# Patient Record
Sex: MALE | Race: ASIAN | NOT HISPANIC OR LATINO
[De-identification: names, ages, dates, MRNs, and addresses within clinical notes are randomized per-mention and may not be internally consistent; named-entity substitution may affect disease eponyms.]

---

## 2017-03-06 ENCOUNTER — MEDICATION RENEWAL (OUTPATIENT)
Age: 67
End: 2017-03-06

## 2017-03-06 ENCOUNTER — RX RENEWAL (OUTPATIENT)
Age: 67
End: 2017-03-06

## 2017-03-30 ENCOUNTER — TRANSCRIPTION ENCOUNTER (OUTPATIENT)
Age: 67
End: 2017-03-30

## 2017-03-31 ENCOUNTER — TRANSCRIPTION ENCOUNTER (OUTPATIENT)
Age: 67
End: 2017-03-31

## 2017-05-01 ENCOUNTER — APPOINTMENT (OUTPATIENT)
Dept: CARDIOLOGY | Facility: CLINIC | Age: 67
End: 2017-05-01

## 2017-05-01 ENCOUNTER — NON-APPOINTMENT (OUTPATIENT)
Age: 67
End: 2017-05-01

## 2017-05-01 VITALS
DIASTOLIC BLOOD PRESSURE: 81 MMHG | HEIGHT: 62 IN | BODY MASS INDEX: 22.45 KG/M2 | OXYGEN SATURATION: 100 % | SYSTOLIC BLOOD PRESSURE: 134 MMHG | HEART RATE: 50 BPM | WEIGHT: 122 LBS

## 2017-05-01 DIAGNOSIS — R00.1 BRADYCARDIA, UNSPECIFIED: ICD-10-CM

## 2017-05-18 ENCOUNTER — APPOINTMENT (OUTPATIENT)
Dept: CARDIOLOGY | Facility: CLINIC | Age: 67
End: 2017-05-18

## 2017-05-23 ENCOUNTER — MEDICATION RENEWAL (OUTPATIENT)
Age: 67
End: 2017-05-23

## 2017-06-05 ENCOUNTER — RX RENEWAL (OUTPATIENT)
Age: 67
End: 2017-06-05

## 2017-07-20 ENCOUNTER — RX RENEWAL (OUTPATIENT)
Age: 67
End: 2017-07-20

## 2017-10-23 ENCOUNTER — APPOINTMENT (OUTPATIENT)
Dept: CARDIOLOGY | Facility: CLINIC | Age: 67
End: 2017-10-23
Payer: MEDICARE

## 2017-10-23 PROCEDURE — 93015 CV STRESS TEST SUPVJ I&R: CPT

## 2017-10-23 PROCEDURE — 78452 HT MUSCLE IMAGE SPECT MULT: CPT

## 2017-10-23 PROCEDURE — A9500: CPT

## 2017-10-31 ENCOUNTER — APPOINTMENT (OUTPATIENT)
Dept: OTOLARYNGOLOGY | Facility: CLINIC | Age: 67
End: 2017-10-31
Payer: MEDICARE

## 2017-10-31 PROCEDURE — 99213 OFFICE O/P EST LOW 20 MIN: CPT

## 2017-11-02 ENCOUNTER — RX RENEWAL (OUTPATIENT)
Age: 67
End: 2017-11-02

## 2017-11-09 ENCOUNTER — APPOINTMENT (OUTPATIENT)
Dept: CARDIOLOGY | Facility: CLINIC | Age: 67
End: 2017-11-09
Payer: MEDICARE

## 2017-11-09 VITALS
HEIGHT: 62 IN | WEIGHT: 125 LBS | HEART RATE: 51 BPM | SYSTOLIC BLOOD PRESSURE: 124 MMHG | OXYGEN SATURATION: 100 % | DIASTOLIC BLOOD PRESSURE: 79 MMHG | BODY MASS INDEX: 23 KG/M2

## 2017-11-09 PROCEDURE — 99214 OFFICE O/P EST MOD 30 MIN: CPT

## 2018-02-21 ENCOUNTER — MEDICATION RENEWAL (OUTPATIENT)
Age: 68
End: 2018-02-21

## 2018-03-05 ENCOUNTER — RX RENEWAL (OUTPATIENT)
Age: 68
End: 2018-03-05

## 2018-04-05 ENCOUNTER — RX RENEWAL (OUTPATIENT)
Age: 68
End: 2018-04-05

## 2018-04-19 ENCOUNTER — NON-APPOINTMENT (OUTPATIENT)
Age: 68
End: 2018-04-19

## 2018-04-19 ENCOUNTER — APPOINTMENT (OUTPATIENT)
Dept: CARDIOLOGY | Facility: CLINIC | Age: 68
End: 2018-04-19
Payer: MEDICARE

## 2018-04-19 VITALS
DIASTOLIC BLOOD PRESSURE: 80 MMHG | OXYGEN SATURATION: 100 % | SYSTOLIC BLOOD PRESSURE: 145 MMHG | HEART RATE: 50 BPM | BODY MASS INDEX: 22.26 KG/M2 | WEIGHT: 121 LBS | HEIGHT: 62 IN

## 2018-04-19 PROCEDURE — 99214 OFFICE O/P EST MOD 30 MIN: CPT

## 2018-04-19 PROCEDURE — 93000 ELECTROCARDIOGRAM COMPLETE: CPT

## 2018-05-29 ENCOUNTER — RX RENEWAL (OUTPATIENT)
Age: 68
End: 2018-05-29

## 2018-06-25 ENCOUNTER — RX RENEWAL (OUTPATIENT)
Age: 68
End: 2018-06-25

## 2018-06-25 ENCOUNTER — MEDICATION RENEWAL (OUTPATIENT)
Age: 68
End: 2018-06-25

## 2018-08-09 ENCOUNTER — RX RENEWAL (OUTPATIENT)
Age: 68
End: 2018-08-09

## 2018-10-18 ENCOUNTER — APPOINTMENT (OUTPATIENT)
Dept: CARDIOLOGY | Facility: CLINIC | Age: 68
End: 2018-10-18

## 2018-11-21 ENCOUNTER — APPOINTMENT (OUTPATIENT)
Dept: CARDIOLOGY | Facility: CLINIC | Age: 68
End: 2018-11-21
Payer: MEDICARE

## 2018-11-21 VITALS
HEIGHT: 62 IN | DIASTOLIC BLOOD PRESSURE: 76 MMHG | WEIGHT: 122 LBS | OXYGEN SATURATION: 100 % | SYSTOLIC BLOOD PRESSURE: 136 MMHG | BODY MASS INDEX: 22.45 KG/M2 | HEART RATE: 52 BPM

## 2018-11-21 PROCEDURE — 99214 OFFICE O/P EST MOD 30 MIN: CPT

## 2018-11-21 NOTE — REASON FOR VISIT
[Follow-Up - Clinic] : a clinic follow-up of [Angina Pectoris] : angina pectoris [Coronary Artery Disease] : coronary artery disease [CABG Follow-up] : bypass graft [Hyperlipidemia] : hyperlipidemia [FreeTextEntry1] : Recent stent placed to LIMA-LAD anastomosis

## 2018-11-21 NOTE — HISTORY OF PRESENT ILLNESS
[FreeTextEntry1] : I saw Chano Monge in the office today for a follow up visit. He is a 67-year-old male who is status post coronary bypass graft surgery in 1991 with a LIMA to the LAD, and vein graft to diagonal, OM, and RCA. In 2006 he underwent repeat cardiac catheterization. This showed that the internal mammary graft was closed . There was retrograde filling from a patent diagonal bypass graft to the LAD. This was compromised by an area of 50-70% stenosis. [facility] did not feel that this was amenable to angioplasty. Did go for a second opinion and to [facility] and so Dr. Leavitt who felt that he might attempt this. Since the patient's symptoms were stable we elected not to do this.\par \par The patient has had stable exertional angina on medication. On his  visit, 10/12,  the patient  noted that the angina was coming on more easily and more frequently. They were reminiscent of the symptoms he had prior to his bypass surgery 21 years ago. Also has associated shortness of breath\par \par .We did a regular stress test in November of 2011 with the patient exercised to 12.8 METs. He did have some discomfort with ECG evidence positive for ischemia. His last nuclear stress test in October of 2010  demonstrated chest discomfort with the thallium portion showing normal perfusion.  \par \par  He did have a carotid Doppler 7/14 that showed mild nonobstructing plaque bilaterally. A repeat study performed 5/17 showed no mild to moderate nonobstructive plaque.\par \par An ECG during the October 2012 visit showed new T-wave inversions in the anterior leads. Patient was sent to [facility] for cardiac catheterization which was performed by Dr. Streeter. This showed that his LIMA graft was patent but had a 95% stenosis at the site of anastomosis to the LAD. All 3 native vessels were closed. The vein graft to the circumflex artery is closed, with a 40% lesion in the RCA graft. The patient underwent successful balloon angioplasty of the anastomotic site between the LIMA graft and LAD. The patient's angina had completely resolved since that procedure. A followup electrocardiogram performed in your office demonstrated that the T wave inversions in the anterior leads have resolved. The patient discontinued his Ranexa.\par \par 8/13  the patient had recurrent symptoms of angina. He underwent repeat cardiac catheterization. This showed 90% left main stenosis, and 100% stenosis of all native blood vessels. There was a 90% stenosis at the anastomosis between the LIMA graft and the LAD, 50% stenosis of the vein graft going to the PDA, and a normal vein graft to D1. A stent was placed to the anastomosis between LIMA and LAD.\par \par The patient continued to have stable exertional angina. Also very rarely gets angina at night when he wakes up from bed dreaming. He had a repeat nuclear stress test 10/17. The treadmill showed ST segment depressions with positive chest pain 9 and 1/2 minutes into exercise. Then converted to a chemical tests. Scans were normal.\par \par Patient continues to have stable exertional angina. The symptoms have not significantly changed.\par \par Blood work performed 7/18 demonstrates a total cholesterol 137, triglycerides 46, HDL 83, and LDL 45. CBC and chemistries were unremarkable\par

## 2018-11-21 NOTE — PHYSICAL EXAM
[General Appearance - Well Developed] : well developed [Normal Appearance] : normal appearance [Well Groomed] : well groomed [General Appearance - Well Nourished] : well nourished [No Deformities] : no deformities [General Appearance - In No Acute Distress] : no acute distress [Normal Conjunctiva] : the conjunctiva exhibited no abnormalities [Normal Oral Mucosa] : normal oral mucosa [Normal Jugular Venous A Waves Present] : normal jugular venous A waves present [Normal Jugular Venous V Waves Present] : normal jugular venous V waves present [No Jugular Venous Dominguez A Waves] : no jugular venous dominguez A waves [Bowel Sounds] : normal bowel sounds [Abdomen Soft] : soft [Abdomen Tenderness] : non-tender [Abnormal Walk] : normal gait [Gait - Sufficient For Exercise Testing] : the gait was sufficient for exercise testing [Nail Clubbing] : no clubbing of the fingernails [Cyanosis, Localized] : no localized cyanosis [Skin Color & Pigmentation] : normal skin color and pigmentation [Skin Turgor] : normal skin turgor [Oriented To Time, Place, And Person] : oriented to person, place, and time [Impaired Insight] : insight and judgment were intact [No Anxiety] : not feeling anxious [] : no respiratory distress [Respiration, Rhythm And Depth] : normal respiratory rhythm and effort [Exaggerated Use Of Accessory Muscles For Inspiration] : no accessory muscle use [Auscultation Breath Sounds / Voice Sounds] : lungs were clear to auscultation bilaterally [Normal Rate] : normal [Normal S1] : normal S1 [Normal S2] : normal S2 [No Murmur] : no murmurs heard [2+] : left 2+ [No Abnormalities] : the abdominal aorta was not enlarged and no bruit was heard [No Pitting Edema] : no pitting edema present [S3] : no S3 [S4] : no S4 [Right Carotid Bruit] : no bruit heard over the right carotid [Left Carotid Bruit] : no bruit heard over the left carotid [Right Femoral Bruit] : no bruit heard over the right femoral artery [Left Femoral Bruit] : no bruit heard over the left femoral artery

## 2018-11-21 NOTE — DISCUSSION/SUMMARY
[FreeTextEntry1] : The patient is doing well. He has stable exertional angina without change. On his medications blood pressure and cholesterol fairly well controlled.\par \par He'll stay on his present medication. If his angina gets any worse he will call me. He will schedule a nuclear stress test in the spring and I will see him in 6 months.

## 2018-11-21 NOTE — REVIEW OF SYSTEMS
[Blurry Vision] : blurred vision [see HPI] : see HPI [Joint Pain] : joint pain [Easy Bleeding] : a tendency for easy bleeding [Negative] : Psychiatric [Fever] : no fever [Headache] : no headache [Chills] : no chills [Feeling Fatigued] : not feeling fatigued [Seeing Double (Diplopia)] : no diplopia [Earache] : no earache [Sore Throat] : no sore throat [Sinus Pressure] : no sinus pressure [Dyspnea on exertion] : not dyspnea during exertion [Chest  Pressure] : no chest pressure [Chest Pain] : no chest pain [Lower Ext Edema] : no extremity edema [Leg Claudication] : no intermittent leg claudication [Palpitations] : no palpitations [Skin: A Rash] : no rash: [Skin Lesions] : no skin lesions [Dizziness] : no dizziness [Tremor] : no tremor was seen [Convulsions] : no convulsions [Excessive Thirst] : no polydipsia [Easy Bruising] : no tendency for easy bruising

## 2018-11-26 ENCOUNTER — MEDICATION RENEWAL (OUTPATIENT)
Age: 68
End: 2018-11-26

## 2018-12-17 ENCOUNTER — RX RENEWAL (OUTPATIENT)
Age: 68
End: 2018-12-17

## 2019-03-06 ENCOUNTER — TRANSCRIPTION ENCOUNTER (OUTPATIENT)
Age: 69
End: 2019-03-06

## 2019-04-01 ENCOUNTER — RX RENEWAL (OUTPATIENT)
Age: 69
End: 2019-04-01

## 2019-04-02 ENCOUNTER — RX RENEWAL (OUTPATIENT)
Age: 69
End: 2019-04-02

## 2019-04-17 ENCOUNTER — APPOINTMENT (OUTPATIENT)
Dept: CARDIOLOGY | Facility: CLINIC | Age: 69
End: 2019-04-17
Payer: MEDICARE

## 2019-04-17 PROCEDURE — 78452 HT MUSCLE IMAGE SPECT MULT: CPT

## 2019-04-17 PROCEDURE — 93015 CV STRESS TEST SUPVJ I&R: CPT

## 2019-04-17 PROCEDURE — A9500: CPT

## 2019-04-30 ENCOUNTER — APPOINTMENT (OUTPATIENT)
Dept: CARDIOLOGY | Facility: CLINIC | Age: 69
End: 2019-04-30
Payer: MEDICARE

## 2019-04-30 ENCOUNTER — NON-APPOINTMENT (OUTPATIENT)
Age: 69
End: 2019-04-30

## 2019-04-30 VITALS
WEIGHT: 121 LBS | OXYGEN SATURATION: 100 % | BODY MASS INDEX: 22.26 KG/M2 | HEART RATE: 48 BPM | DIASTOLIC BLOOD PRESSURE: 80 MMHG | HEIGHT: 62 IN | SYSTOLIC BLOOD PRESSURE: 129 MMHG

## 2019-04-30 PROCEDURE — 93000 ELECTROCARDIOGRAM COMPLETE: CPT

## 2019-04-30 PROCEDURE — 99215 OFFICE O/P EST HI 40 MIN: CPT

## 2019-04-30 NOTE — HISTORY OF PRESENT ILLNESS
[FreeTextEntry1] : I saw Chano Monge in the office today for a follow up visit. He is a 68-year-old male who is status post coronary bypass graft surgery in 1991 with a LIMA to the LAD, and vein graft to diagonal, OM, and RCA. In 2006 he underwent repeat cardiac catheterization. This showed that the internal mammary graft was closed . There was retrograde filling from a patent diagonal bypass graft to the LAD. This was compromised by an area of 50-70% stenosis. [facility] did not feel that this was amenable to angioplasty. Did go for a second opinion and to [facility] and so Dr. Leavitt who felt that he might attempt this. Since the patient's symptoms were stable we elected not to do this.\par \par The patient has had stable exertional angina on medication. On his  visit, 10/12,  the patient  noted that the angina was coming on more easily and more frequently. They were reminiscent of the symptoms he had prior to his bypass surgery 21 years ago. Also has associated shortness of breath\par \par .We did a regular stress test in November of 2011 with the patient exercised to 12.8 METs. He did have some discomfort with ECG evidence positive for ischemia. His last nuclear stress test in October of 2010  demonstrated chest discomfort with the thallium portion showing normal perfusion.  \par \par  He did have a carotid Doppler 7/14 that showed mild nonobstructing plaque bilaterally. A repeat study performed 5/17 showed no mild to moderate nonobstructive plaque.\par \par An ECG during the October 2012 visit showed new T-wave inversions in the anterior leads. Patient was sent to [facility] for cardiac catheterization which was performed by Dr. Streeter. This showed that his LIMA graft was patent but had a 95% stenosis at the site of anastomosis to the LAD. All 3 native vessels were closed. The vein graft to the circumflex artery is closed, with a 40% lesion in the RCA graft. The patient underwent successful balloon angioplasty of the anastomotic site between the LIMA graft and LAD. The patient's angina had completely resolved since that procedure. A followup electrocardiogram performed in your office demonstrated that the T wave inversions in the anterior leads have resolved. The patient discontinued his Ranexa.\par \par 8/13  the patient had recurrent symptoms of angina. He underwent repeat cardiac catheterization. This showed 90% left main stenosis, and 100% stenosis of all native blood vessels. There was a 90% stenosis at the anastomosis between the LIMA graft and the LAD, 50% stenosis of the vein graft going to the PDA, and a normal vein graft to D1. A stent was placed to the anastomosis between LIMA and LAD.\par \par The patient continued to have stable exertional angina. Also very rarely gets angina at night when he wakes up from bed dreaming. He had a repeat nuclear stress test 10/17. The treadmill showed ST segment depressions with positive chest pain 9 and 1/2 minutes into exercise. Then converted to a chemical tests. Scans were normal.\par \par Most recent chemical nuclear stress test performed 4/19 demonstrated a small, mild to moderate anterolateral wall reversible defect consistent with ischemia. EF 68%\par \par Patient continues to have stable exertional angina. The symptoms have not significantly changed.He does fatigue more easily\par \par A resting 12-lead electrocardiogram demonstrates sinus bradycardia at 46. There is T wave negativity in lead V1 and V2 unchanged.\par \par Blood work performed 7/18 demonstrates a total cholesterol 137, triglycerides 46, HDL 83, and LDL 45. CBC and chemistries were unremarkable\par

## 2019-04-30 NOTE — DISCUSSION/SUMMARY
[FreeTextEntry1] : Clinically the patient is doing well without any significant change in his angina. His exercise tolerance has declined which may be from the increasing sinus bradycardia. I am going to try reducing the Toprol from 25-12.5 mg once a day. If the angina gets worse he will call me. Otherwise I'll give him a couple weeks and see if he feels any stronger.\par \par We went over in detail the results of the nuclear stress test. There is a small area of lateral wall ischemia which probably is not clinically significant in the absence of any change in angina. This is a new finding but probably does not indicate significant change in his coronary anatomy.\par \par His other medicines will remain the same. If he has any problems he will call me. I would appreciate your sending me a copy of his most recent bloodwork for my review.\par \par Pending the above I will see him in 4 months. He also will schedule a followup echocardiogram.\par \par The above was discussed in detail with the patient and I answered all his questions

## 2019-07-26 ENCOUNTER — RX RENEWAL (OUTPATIENT)
Age: 69
End: 2019-07-26

## 2019-08-02 ENCOUNTER — APPOINTMENT (OUTPATIENT)
Dept: CARDIOLOGY | Facility: CLINIC | Age: 69
End: 2019-08-02
Payer: MEDICARE

## 2019-08-02 PROCEDURE — 93306 TTE W/DOPPLER COMPLETE: CPT

## 2019-09-05 ENCOUNTER — RX RENEWAL (OUTPATIENT)
Age: 69
End: 2019-09-05

## 2019-10-08 ENCOUNTER — APPOINTMENT (OUTPATIENT)
Dept: INTERNAL MEDICINE | Facility: CLINIC | Age: 69
End: 2019-10-08
Payer: MEDICARE

## 2019-10-08 VITALS
DIASTOLIC BLOOD PRESSURE: 72 MMHG | BODY MASS INDEX: 22.26 KG/M2 | OXYGEN SATURATION: 100 % | TEMPERATURE: 97.8 F | HEIGHT: 62 IN | WEIGHT: 121 LBS | HEART RATE: 55 BPM | SYSTOLIC BLOOD PRESSURE: 131 MMHG

## 2019-10-08 DIAGNOSIS — Z23 ENCOUNTER FOR IMMUNIZATION: ICD-10-CM

## 2019-10-08 DIAGNOSIS — Z98.62 PERIPHERAL VASCULAR ANGIOPLASTY STATUS: ICD-10-CM

## 2019-10-08 DIAGNOSIS — E55.9 VITAMIN D DEFICIENCY, UNSPECIFIED: ICD-10-CM

## 2019-10-08 DIAGNOSIS — H91.91 UNSPECIFIED HEARING LOSS, RIGHT EAR: ICD-10-CM

## 2019-10-08 DIAGNOSIS — Z78.9 OTHER SPECIFIED HEALTH STATUS: ICD-10-CM

## 2019-10-08 DIAGNOSIS — Z76.89 PERSONS ENCOUNTERING HEALTH SERVICES IN OTHER SPECIFIED CIRCUMSTANCES: ICD-10-CM

## 2019-10-08 DIAGNOSIS — Z87.891 PERSONAL HISTORY OF NICOTINE DEPENDENCE: ICD-10-CM

## 2019-10-08 DIAGNOSIS — H91.20 SUDDEN IDIOPATHIC HEARING LOSS, UNSPECIFIED EAR: ICD-10-CM

## 2019-10-08 DIAGNOSIS — H54.7 UNSPECIFIED VISUAL LOSS: ICD-10-CM

## 2019-10-08 DIAGNOSIS — J30.2 OTHER SEASONAL ALLERGIC RHINITIS: ICD-10-CM

## 2019-10-08 DIAGNOSIS — R35.1 NOCTURIA: ICD-10-CM

## 2019-10-08 DIAGNOSIS — Z00.00 ENCOUNTER FOR GENERAL ADULT MEDICAL EXAMINATION W/OUT ABNORMAL FINDINGS: ICD-10-CM

## 2019-10-08 DIAGNOSIS — B35.1 TINEA UNGUIUM: ICD-10-CM

## 2019-10-08 DIAGNOSIS — I25.2 OLD MYOCARDIAL INFARCTION: ICD-10-CM

## 2019-10-08 DIAGNOSIS — R73.01 IMPAIRED FASTING GLUCOSE: ICD-10-CM

## 2019-10-08 PROCEDURE — 36415 COLL VENOUS BLD VENIPUNCTURE: CPT

## 2019-10-08 PROCEDURE — G0008: CPT

## 2019-10-08 PROCEDURE — 90662 IIV NO PRSV INCREASED AG IM: CPT

## 2019-10-08 PROCEDURE — 99204 OFFICE O/P NEW MOD 45 MIN: CPT | Mod: 25

## 2019-10-08 RX ORDER — PSYLLIUM HUSK 0.4 G
CAPSULE ORAL
Refills: 0 | Status: ACTIVE | COMMUNITY

## 2019-10-08 RX ORDER — RANOLAZINE 1000 MG/1
1000 TABLET, EXTENDED RELEASE ORAL
Qty: 90 | Refills: 3 | Status: DISCONTINUED | COMMUNITY
Start: 2019-04-01 | End: 2019-10-08

## 2019-10-08 RX ORDER — TRIAMCINOLONE ACETONIDE 55 UG/1
55 SPRAY, METERED NASAL
Refills: 0 | Status: ACTIVE | COMMUNITY

## 2019-10-08 RX ORDER — MELATONIN 3 MG
TABLET ORAL
Refills: 0 | Status: ACTIVE | COMMUNITY

## 2019-10-09 PROBLEM — R73.01 IMPAIRED FASTING GLUCOSE: Status: ACTIVE | Noted: 2019-10-09

## 2019-10-10 ENCOUNTER — TRANSCRIPTION ENCOUNTER (OUTPATIENT)
Age: 69
End: 2019-10-10

## 2019-10-10 LAB
25(OH)D3 SERPL-MCNC: 41.5 NG/ML
ALBUMIN SERPL ELPH-MCNC: 5 G/DL
ALP BLD-CCNC: 62 U/L
ALT SERPL-CCNC: 31 U/L
ANION GAP SERPL CALC-SCNC: 10 MMOL/L
APPEARANCE: CLEAR
AST SERPL-CCNC: 26 U/L
BASOPHILS # BLD AUTO: 0.02 K/UL
BASOPHILS NFR BLD AUTO: 0.4 %
BILIRUB SERPL-MCNC: 0.5 MG/DL
BILIRUBIN URINE: NEGATIVE
BLOOD URINE: NEGATIVE
BUN SERPL-MCNC: 9 MG/DL
CALCIUM SERPL-MCNC: 9.8 MG/DL
CHLORIDE SERPL-SCNC: 97 MMOL/L
CHOLEST SERPL-MCNC: 132 MG/DL
CHOLEST/HDLC SERPL: 1.7 RATIO
CO2 SERPL-SCNC: 26 MMOL/L
COLOR: NORMAL
CREAT SERPL-MCNC: 0.93 MG/DL
EOSINOPHIL # BLD AUTO: 0.05 K/UL
EOSINOPHIL NFR BLD AUTO: 1.1 %
ESTIMATED AVERAGE GLUCOSE: 108 MG/DL
GLUCOSE QUALITATIVE U: NEGATIVE
GLUCOSE SERPL-MCNC: 114 MG/DL
HBA1C MFR BLD HPLC: 5.4 %
HCT VFR BLD CALC: 42.5 %
HDLC SERPL-MCNC: 76 MG/DL
HGB BLD-MCNC: 14.3 G/DL
IMM GRANULOCYTES NFR BLD AUTO: 0.2 %
KETONES URINE: NEGATIVE
LDLC SERPL CALC-MCNC: 49 MG/DL
LEUKOCYTE ESTERASE URINE: NEGATIVE
LYMPHOCYTES # BLD AUTO: 1.23 K/UL
LYMPHOCYTES NFR BLD AUTO: 27.2 %
MAN DIFF?: NORMAL
MCHC RBC-ENTMCNC: 33.6 GM/DL
MCHC RBC-ENTMCNC: 34.5 PG
MCV RBC AUTO: 102.7 FL
MONOCYTES # BLD AUTO: 0.54 K/UL
MONOCYTES NFR BLD AUTO: 11.9 %
NEUTROPHILS # BLD AUTO: 2.68 K/UL
NEUTROPHILS NFR BLD AUTO: 59.2 %
NITRITE URINE: NEGATIVE
PH URINE: 5
PLATELET # BLD AUTO: 222 K/UL
POTASSIUM SERPL-SCNC: 4.6 MMOL/L
PROT SERPL-MCNC: 7.3 G/DL
PROTEIN URINE: NEGATIVE
RBC # BLD: 4.14 M/UL
RBC # FLD: 11.8 %
SODIUM SERPL-SCNC: 133 MMOL/L
SPECIFIC GRAVITY URINE: 1.01
TRIGL SERPL-MCNC: 34 MG/DL
TSH SERPL-ACNC: 2.22 UIU/ML
UROBILINOGEN URINE: NORMAL
WBC # FLD AUTO: 4.53 K/UL

## 2019-10-23 ENCOUNTER — RX RENEWAL (OUTPATIENT)
Age: 69
End: 2019-10-23

## 2019-11-18 ENCOUNTER — MEDICATION RENEWAL (OUTPATIENT)
Age: 69
End: 2019-11-18

## 2019-12-30 ENCOUNTER — MEDICATION RENEWAL (OUTPATIENT)
Age: 69
End: 2019-12-30

## 2020-01-07 ENCOUNTER — APPOINTMENT (OUTPATIENT)
Dept: CARDIOLOGY | Facility: CLINIC | Age: 70
End: 2020-01-07
Payer: MEDICARE

## 2020-01-07 VITALS
DIASTOLIC BLOOD PRESSURE: 75 MMHG | OXYGEN SATURATION: 100 % | BODY MASS INDEX: 22.45 KG/M2 | HEIGHT: 62 IN | HEART RATE: 62 BPM | SYSTOLIC BLOOD PRESSURE: 125 MMHG | WEIGHT: 122 LBS

## 2020-01-07 DIAGNOSIS — R09.89 OTHER SPECIFIED SYMPTOMS AND SIGNS INVOLVING THE CIRCULATORY AND RESPIRATORY SYSTEMS: ICD-10-CM

## 2020-01-07 PROCEDURE — 99214 OFFICE O/P EST MOD 30 MIN: CPT

## 2020-01-07 RX ORDER — NITROGLYCERIN 0.4 MG/1
0.4 TABLET SUBLINGUAL
Qty: 25 | Refills: 3 | Status: ACTIVE | COMMUNITY
Start: 1900-01-01 | End: 1900-01-01

## 2020-01-07 NOTE — HISTORY OF PRESENT ILLNESS
[FreeTextEntry1] : I saw Chano Monge in the office today for a follow up visit. He is a 69-year-old male who is status post coronary bypass graft surgery in 1991 with a LIMA to the LAD, and vein graft to diagonal, OM, and RCA. In 2006 he underwent repeat cardiac catheterization. This showed that the internal mammary graft was closed . There was retrograde filling from a patent diagonal bypass graft to the LAD. This was compromised by an area of 50-70% stenosis. [facility] did not feel that this was amenable to angioplasty. Did go for a second opinion and to [facility] and so Dr. Leavitt who felt that he might attempt this. Since the patient's symptoms were stable we elected not to do this.\par \par The patient has had stable exertional angina on medication. On his  visit, 10/12,  the patient  noted that the angina was coming on more easily and more frequently. They were reminiscent of the symptoms he had prior to his bypass surgery 21 years ago. Also has associated shortness of breath\par \par .We did a regular stress test in November of 2011 with the patient exercised to 12.8 METs. He did have some discomfort with ECG evidence positive for ischemia. His last nuclear stress test in October of 2010  demonstrated chest discomfort with the thallium portion showing normal perfusion.  \par \par  He did have a carotid Doppler 7/14 that showed mild nonobstructing plaque bilaterally. A repeat study performed 5/17 showed no mild to moderate nonobstructive plaque.\par \par An ECG during the October 2012 visit showed new T-wave inversions in the anterior leads. Patient was sent to [facility] for cardiac catheterization which was performed by Dr. Streeter. This showed that his LIMA graft was patent but had a 95% stenosis at the site of anastomosis to the LAD. All 3 native vessels were closed. The vein graft to the circumflex artery is closed, with a 40% lesion in the RCA graft. The patient underwent successful balloon angioplasty of the anastomotic site between the LIMA graft and LAD. The patient's angina had completely resolved since that procedure. A followup electrocardiogram performed in your office demonstrated that the T wave inversions in the anterior leads have resolved. The patient discontinued his Ranexa.\par \par 8/13  the patient had recurrent symptoms of angina. He underwent repeat cardiac catheterization. This showed 90% left main stenosis, and 100% stenosis of all native blood vessels. There was a 90% stenosis at the anastomosis between the LIMA graft and the LAD, 50% stenosis of the vein graft going to the PDA, and a normal vein graft to D1. A stent was placed to the anastomosis between LIMA and LAD.\par \par The patient continued to have stable exertional angina. Also very rarely gets angina at night when he wakes up from bed dreaming. He had a repeat nuclear stress test 10/17. The treadmill showed ST segment depressions with positive chest pain 9 and 1/2 minutes into exercise. Then converted to a chemical tests. Scans were normal.\par \par Most recent chemical nuclear stress test performed 4/19 demonstrated a small, mild to moderate anterolateral wall reversible defect consistent with ischemia. EF 68% Echo performed 8/19 demonstrated an ejection fraction of 69%. There is mild to moderate MR and TR with PA pressure of 37. Minimal AI. Mild LVH\par \par Patient continues to have stable exertional angina. The symptoms have not significantly changed.He does fatigue more easily. I did reduce his Toprol down to 12.5 mg once a day. His resting heart rate has increased but he does not feel any less fatigue. His angina has not changed.\par \par Blood work performed 10/19 demonstrated a cholesterol 132, triglycerides 34, HDL 76, and LDL 49. A1c 5.4.\par \par

## 2020-01-07 NOTE — DISCUSSION/SUMMARY
[FreeTextEntry1] : The patient is doing well. He has stable angina. On his medication his cholesterol is well controlled. Blood pressure is normal. We did go over his recent blood work and discussed his medication.\par \par He will stay on his present medication. If he has any increasing symptoms or problems he will call me. He will schedule a followup carotid Doppler in May and I would see him in 6 months. He will call me if any of his symptoms change

## 2020-01-07 NOTE — PHYSICAL EXAM
[Well Groomed] : well groomed [General Appearance - Well Developed] : well developed [Normal Appearance] : normal appearance [General Appearance - Well Nourished] : well nourished [General Appearance - In No Acute Distress] : no acute distress [No Deformities] : no deformities [Normal Oral Mucosa] : normal oral mucosa [Normal Conjunctiva] : the conjunctiva exhibited no abnormalities [Normal Jugular Venous A Waves Present] : normal jugular venous A waves present [Normal Jugular Venous V Waves Present] : normal jugular venous V waves present [No Jugular Venous Dominguez A Waves] : no jugular venous dominguez A waves [Abdomen Tenderness] : non-tender [Abdomen Soft] : soft [Bowel Sounds] : normal bowel sounds [Abnormal Walk] : normal gait [Nail Clubbing] : no clubbing of the fingernails [Gait - Sufficient For Exercise Testing] : the gait was sufficient for exercise testing [Cyanosis, Localized] : no localized cyanosis [Skin Turgor] : normal skin turgor [Skin Color & Pigmentation] : normal skin color and pigmentation [Impaired Insight] : insight and judgment were intact [Oriented To Time, Place, And Person] : oriented to person, place, and time [No Anxiety] : not feeling anxious [] : no respiratory distress [Exaggerated Use Of Accessory Muscles For Inspiration] : no accessory muscle use [Respiration, Rhythm And Depth] : normal respiratory rhythm and effort [Auscultation Breath Sounds / Voice Sounds] : lungs were clear to auscultation bilaterally [Normal S1] : normal S1 [Normal Rate] : normal [Normal S2] : normal S2 [No Murmur] : no murmurs heard [2+] : left 2+ [No Abnormalities] : the abdominal aorta was not enlarged and no bruit was heard [No Pitting Edema] : no pitting edema present [S3] : no S3 [Left Carotid Bruit] : no bruit heard over the left carotid [Right Carotid Bruit] : no bruit heard over the right carotid [S4] : no S4 [Left Femoral Bruit] : no bruit heard over the left femoral artery [Right Femoral Bruit] : no bruit heard over the right femoral artery

## 2020-01-07 NOTE — REASON FOR VISIT
[Coronary Artery Disease] : coronary artery disease [Follow-Up - Clinic] : a clinic follow-up of [Angina Pectoris] : angina pectoris [CABG Follow-up] : bypass graft [Hyperlipidemia] : hyperlipidemia [FreeTextEntry1] : Recent stent placed to LIMA-LAD anastomosis

## 2020-01-07 NOTE — REVIEW OF SYSTEMS
[Blurry Vision] : blurred vision [see HPI] : see HPI [Joint Pain] : joint pain [Easy Bleeding] : a tendency for easy bleeding [Negative] : Psychiatric [Fever] : no fever [Headache] : no headache [Feeling Fatigued] : not feeling fatigued [Chills] : no chills [Earache] : no earache [Seeing Double (Diplopia)] : no diplopia [Sore Throat] : no sore throat [Sinus Pressure] : no sinus pressure [Chest Pain] : no chest pain [Chest  Pressure] : no chest pressure [Dyspnea on exertion] : not dyspnea during exertion [Lower Ext Edema] : no extremity edema [Leg Claudication] : no intermittent leg claudication [Skin: A Rash] : no rash: [Palpitations] : no palpitations [Dizziness] : no dizziness [Skin Lesions] : no skin lesions [Tremor] : no tremor was seen [Excessive Thirst] : no polydipsia [Convulsions] : no convulsions [Easy Bruising] : no tendency for easy bruising

## 2020-01-31 ENCOUNTER — RX RENEWAL (OUTPATIENT)
Age: 70
End: 2020-01-31

## 2020-02-18 ENCOUNTER — RX RENEWAL (OUTPATIENT)
Age: 70
End: 2020-02-18

## 2020-07-07 ENCOUNTER — APPOINTMENT (OUTPATIENT)
Dept: CARDIOLOGY | Facility: CLINIC | Age: 70
End: 2020-07-07
Payer: MEDICARE

## 2020-07-07 DIAGNOSIS — I20.9 ANGINA PECTORIS, UNSPECIFIED: ICD-10-CM

## 2020-07-07 PROCEDURE — 99213 OFFICE O/P EST LOW 20 MIN: CPT | Mod: 95

## 2020-07-07 NOTE — HISTORY OF PRESENT ILLNESS
[FreeTextEntry1] : I saw Chano Monge in the office today for a follow up visit via telehealth. He is a 69-year-old male who is status post coronary bypass graft surgery in 1991 with a LIMA to the LAD, and vein graft to diagonal, OM, and RCA. In 2006 he underwent repeat cardiac catheterization. This showed that the internal mammary graft was closed . There was retrograde filling from a patent diagonal bypass graft to the LAD. This was compromised by an area of 50-70% stenosis. [facility] did not feel that this was amenable to angioplasty. Did go for a second opinion and to [facility] and so Dr. Leavitt who felt that he might attempt this. Since the patient's symptoms were stable we elected not to do this.\par \par The patient has had stable exertional angina on medication. On his  visit, 10/12,  the patient  noted that the angina was coming on more easily and more frequently. They were reminiscent of the symptoms he had prior to his bypass surgery 21 years ago. Also has associated shortness of breath\par \par .We did a regular stress test in November of 2011 with the patient exercised to 12.8 METs. He did have some discomfort with ECG evidence positive for ischemia. His last nuclear stress test in October of 2010  demonstrated chest discomfort with the thallium portion showing normal perfusion.  \par \par  He did have a carotid Doppler 7/14 that showed mild nonobstructing plaque bilaterally. A repeat study performed 5/17 showed no mild to moderate nonobstructive plaque.\par \par An ECG during the October 2012 visit showed new T-wave inversions in the anterior leads. Patient was sent to [facility] for cardiac catheterization which was performed by Dr. Streeter. This showed that his LIMA graft was patent but had a 95% stenosis at the site of anastomosis to the LAD. All 3 native vessels were closed. The vein graft to the circumflex artery is closed, with a 40% lesion in the RCA graft. The patient underwent successful balloon angioplasty of the anastomotic site between the LIMA graft and LAD. The patient's angina had completely resolved since that procedure. A followup electrocardiogram performed in your office demonstrated that the T wave inversions in the anterior leads have resolved. The patient discontinued his Ranexa.\par \par 8/13  the patient had recurrent symptoms of angina. He underwent repeat cardiac catheterization. This showed 90% left main stenosis, and 100% stenosis of all native blood vessels. There was a 90% stenosis at the anastomosis between the LIMA graft and the LAD, 50% stenosis of the vein graft going to the PDA, and a normal vein graft to D1. A stent was placed to the anastomosis between LIMA and LAD.\par \par The patient continued to have stable exertional angina. Also very rarely gets angina at night when he wakes up from bed dreaming. He had a repeat nuclear stress test 10/17. The treadmill showed ST segment depressions with positive chest pain 9 and 1/2 minutes into exercise. Then converted to a chemical tests. Scans were normal.\par \par Most recent chemical nuclear stress test performed 4/19 demonstrated a small, mild to moderate anterolateral wall reversible defect consistent with ischemia. EF 68% Echo performed 8/19 demonstrated an ejection fraction of 69%. There is mild to moderate MR and TR with PA pressure of 37. Minimal AI. Mild LVH\par \par On his last visit we have reduced his metoprolol to 12.5 mg once a day because of resting sinus bradycardia. More recently the patient has noted that he gets angina at a lower work load. He has no other symptoms.\par \par Most recent blood work from 1/20 demonstrated normal CBC and chemistries. Cholesterol 136, triglycerides 61, HDL 69, and LDL 55.\par \par

## 2020-07-07 NOTE — PHYSICAL EXAM
[Normal Conjunctiva] : the conjunctiva exhibited no abnormalities [Normal Oral Mucosa] : normal oral mucosa [Normal Jugular Venous A Waves Present] : normal jugular venous A waves present [Normal Jugular Venous V Waves Present] : normal jugular venous V waves present [No Jugular Venous Dominguez A Waves] : no jugular venous dominguez A waves [Bowel Sounds] : normal bowel sounds [Abdomen Soft] : soft [Abdomen Tenderness] : non-tender [Abnormal Walk] : normal gait [Gait - Sufficient For Exercise Testing] : the gait was sufficient for exercise testing [Nail Clubbing] : no clubbing of the fingernails [Cyanosis, Localized] : no localized cyanosis [Skin Color & Pigmentation] : normal skin color and pigmentation [Skin Turgor] : normal skin turgor [] : no respiratory distress [Respiration, Rhythm And Depth] : normal respiratory rhythm and effort [Exaggerated Use Of Accessory Muscles For Inspiration] : no accessory muscle use [Auscultation Breath Sounds / Voice Sounds] : lungs were clear to auscultation bilaterally [Normal S1] : normal S1 [Normal Rate] : normal [Normal S2] : normal S2 [No Murmur] : no murmurs heard [2+] : left 2+ [No Abnormalities] : the abdominal aorta was not enlarged and no bruit was heard [No Pitting Edema] : no pitting edema present [S3] : no S3 [S4] : no S4 [Right Carotid Bruit] : no bruit heard over the right carotid [Left Carotid Bruit] : no bruit heard over the left carotid [Right Femoral Bruit] : no bruit heard over the right femoral artery [Left Femoral Bruit] : no bruit heard over the left femoral artery [General Appearance - Well Developed] : well developed [Normal Appearance] : normal appearance [Well Groomed] : well groomed [General Appearance - Well Nourished] : well nourished [No Deformities] : no deformities [General Appearance - In No Acute Distress] : no acute distress [Oriented To Time, Place, And Person] : oriented to person, place, and time [No Anxiety] : not feeling anxious [Impaired Insight] : insight and judgment were intact

## 2020-07-07 NOTE — REVIEW OF SYSTEMS
[Blurry Vision] : blurred vision [see HPI] : see HPI [Joint Pain] : joint pain [Easy Bleeding] : a tendency for easy bleeding [Negative] : Psychiatric [Fever] : no fever [Chills] : no chills [Headache] : no headache [Feeling Fatigued] : not feeling fatigued [Seeing Double (Diplopia)] : no diplopia [Earache] : no earache [Sore Throat] : no sore throat [Sinus Pressure] : no sinus pressure [Dyspnea on exertion] : not dyspnea during exertion [Chest  Pressure] : no chest pressure [Chest Pain] : no chest pain [Lower Ext Edema] : no extremity edema [Leg Claudication] : no intermittent leg claudication [Skin: A Rash] : no rash: [Palpitations] : no palpitations [Skin Lesions] : no skin lesions [Dizziness] : no dizziness [Tremor] : no tremor was seen [Convulsions] : no convulsions [Excessive Thirst] : no polydipsia [Easy Bruising] : no tendency for easy bruising

## 2020-07-07 NOTE — REVIEW OF SYSTEMS
[Blurry Vision] : blurred vision [see HPI] : see HPI [Joint Pain] : joint pain [Easy Bleeding] : a tendency for easy bleeding [Negative] : Psychiatric [Fever] : no fever [Headache] : no headache [Chills] : no chills [Feeling Fatigued] : not feeling fatigued [Seeing Double (Diplopia)] : no diplopia [Earache] : no earache [Sinus Pressure] : no sinus pressure [Sore Throat] : no sore throat [Dyspnea on exertion] : not dyspnea during exertion [Chest  Pressure] : no chest pressure [Chest Pain] : no chest pain [Lower Ext Edema] : no extremity edema [Leg Claudication] : no intermittent leg claudication [Skin: A Rash] : no rash: [Palpitations] : no palpitations [Skin Lesions] : no skin lesions [Dizziness] : no dizziness [Tremor] : no tremor was seen [Convulsions] : no convulsions [Excessive Thirst] : no polydipsia [Easy Bruising] : no tendency for easy bruising

## 2020-07-07 NOTE — DISCUSSION/SUMMARY
[FreeTextEntry1] : The patient is getting angina symptoms more easily and at a lower workload. That is predominantly when he is exercising during his normal activity. He has no shortness of breath.\par \par I would elect to perform another stress test to see if his Cardiovascular disease has progressed. We will try to do a treadmill. He will hold his Toprol the morning of the test we will be prepared to convert to a chemical tests if necessary. In the meantime he'll give me a copy of his most recent blood work and he will stay on his present medication.\par \par This was discussed with the patient and I answered his questions.

## 2020-07-07 NOTE — REASON FOR VISIT
[Follow-Up - Clinic] : a clinic follow-up of [Angina Pectoris] : angina pectoris [Coronary Artery Disease] : coronary artery disease [CABG Follow-up] : bypass graft [Hyperlipidemia] : hyperlipidemia [FreeTextEntry1] : Recent stent placed to LIMA-LAD anastomosis [Home] : at home, [unfilled] , at the time of the visit. [Verbal consent obtained from patient] : the patient, [unfilled] [Medical Office: ([facility])___] : at the medical office located in

## 2020-07-07 NOTE — HISTORY OF PRESENT ILLNESS
[FreeTextEntry1] : I saw Chano Monge in the office today for a follow up visit via telehealth. He is a 69-year-old male who is status post coronary bypass graft surgery in 1991 with a LIMA to the LAD, and vein graft to diagonal, OM, and RCA. In 2006 he underwent repeat cardiac catheterization. This showed that the internal mammary graft was closed . There was retrograde filling from a patent diagonal bypass graft to the LAD. This was compromised by an area of 50-70% stenosis. [facility] did not feel that this was amenable to angioplasty. Did go for a second opinion and to [facility] and so Dr. Leavitt who felt that he might attempt this. Since the patient's symptoms were stable we elected not to do this.\par \par The patient has had stable exertional angina on medication. On his  visit, 10/12,  the patient  noted that the angina was coming on more easily and more frequently. They were reminiscent of the symptoms he had prior to his bypass surgery 21 years ago. Also has associated shortness of breath\par \par .We did a regular stress test in November of 2011 with the patient exercised to 12.8 METs. He did have some discomfort with ECG evidence positive for ischemia. His last nuclear stress test in October of 2010  demonstrated chest discomfort with the thallium portion showing normal perfusion.  \par \par  He did have a carotid Doppler 7/14 that showed mild nonobstructing plaque bilaterally. A repeat study performed 5/17 showed no mild to moderate nonobstructive plaque.\par \par An ECG during the October 2012 visit showed new T-wave inversions in the anterior leads. Patient was sent to [facility] for cardiac catheterization which was performed by Dr. Streeter. This showed that his LIMA graft was patent but had a 95% stenosis at the site of anastomosis to the LAD. All 3 native vessels were closed. The vein graft to the circumflex artery is closed, with a 40% lesion in the RCA graft. The patient underwent successful balloon angioplasty of the anastomotic site between the LIMA graft and LAD. The patient's angina had completely resolved since that procedure. A followup electrocardiogram performed in your office demonstrated that the T wave inversions in the anterior leads have resolved. The patient discontinued his Ranexa.\par \par 8/13  the patient had recurrent symptoms of angina. He underwent repeat cardiac catheterization. This showed 90% left main stenosis, and 100% stenosis of all native blood vessels. There was a 90% stenosis at the anastomosis between the LIMA graft and the LAD, 50% stenosis of the vein graft going to the PDA, and a normal vein graft to D1. A stent was placed to the anastomosis between LIMA and LAD.\par \par The patient continued to have stable exertional angina. Also very rarely gets angina at night when he wakes up from bed dreaming. He had a repeat nuclear stress test 10/17. The treadmill showed ST segment depressions with positive chest pain 9 and 1/2 minutes into exercise. Then converted to a chemical tests. Scans were normal.\par \par Most recent chemical nuclear stress test performed 4/19 demonstrated a small, mild to moderate anterolateral wall reversible defect consistent with ischemia. EF 68% Echo performed 8/19 demonstrated an ejection fraction of 69%. There is mild to moderate MR and TR with PA pressure of 37. Minimal AI. Mild LVH\par \par On his last visit we have reduced his metoprolol to 12.5 mg once a day because of resting sinus bradycardia. More recently the patient has noted that he gets angina at a lower work load. He has no other symptoms.\par \par Most recent blood work from 1/20 demonstrated normal CBC and chemistries. Cholesterol 136, triglycerides 61, HDL 69, and LDL 55.\par \par

## 2020-07-07 NOTE — REASON FOR VISIT
[Follow-Up - Clinic] : a clinic follow-up of [Angina Pectoris] : angina pectoris [Coronary Artery Disease] : coronary artery disease [CABG Follow-up] : bypass graft [Hyperlipidemia] : hyperlipidemia [FreeTextEntry1] : Recent stent placed to LIMA-LAD anastomosis [Home] : at home, [unfilled] , at the time of the visit. [Medical Office: ([facility])___] : at the medical office located in  [Verbal consent obtained from patient] : the patient, [unfilled]

## 2020-07-07 NOTE — PHYSICAL EXAM
[Normal Conjunctiva] : the conjunctiva exhibited no abnormalities [Normal Oral Mucosa] : normal oral mucosa [Normal Jugular Venous A Waves Present] : normal jugular venous A waves present [Normal Jugular Venous V Waves Present] : normal jugular venous V waves present [No Jugular Venous Dominguez A Waves] : no jugular venous dominguez A waves [Bowel Sounds] : normal bowel sounds [Abdomen Soft] : soft [Abdomen Tenderness] : non-tender [Abnormal Walk] : normal gait [Gait - Sufficient For Exercise Testing] : the gait was sufficient for exercise testing [Nail Clubbing] : no clubbing of the fingernails [Cyanosis, Localized] : no localized cyanosis [Skin Color & Pigmentation] : normal skin color and pigmentation [Skin Turgor] : normal skin turgor [] : no respiratory distress [Exaggerated Use Of Accessory Muscles For Inspiration] : no accessory muscle use [Respiration, Rhythm And Depth] : normal respiratory rhythm and effort [Auscultation Breath Sounds / Voice Sounds] : lungs were clear to auscultation bilaterally [Normal S1] : normal S1 [Normal Rate] : normal [Normal S2] : normal S2 [No Murmur] : no murmurs heard [2+] : left 2+ [No Abnormalities] : the abdominal aorta was not enlarged and no bruit was heard [No Pitting Edema] : no pitting edema present [S3] : no S3 [S4] : no S4 [Right Carotid Bruit] : no bruit heard over the right carotid [Left Carotid Bruit] : no bruit heard over the left carotid [Right Femoral Bruit] : no bruit heard over the right femoral artery [Left Femoral Bruit] : no bruit heard over the left femoral artery [General Appearance - Well Developed] : well developed [Well Groomed] : well groomed [Normal Appearance] : normal appearance [No Deformities] : no deformities [General Appearance - Well Nourished] : well nourished [Oriented To Time, Place, And Person] : oriented to person, place, and time [General Appearance - In No Acute Distress] : no acute distress [Impaired Insight] : insight and judgment were intact [No Anxiety] : not feeling anxious

## 2020-07-10 ENCOUNTER — APPOINTMENT (OUTPATIENT)
Dept: CARDIOLOGY | Facility: CLINIC | Age: 70
End: 2020-07-10
Payer: MEDICARE

## 2020-07-10 PROCEDURE — A9500: CPT

## 2020-07-10 PROCEDURE — 93015 CV STRESS TEST SUPVJ I&R: CPT

## 2020-07-10 PROCEDURE — 78452 HT MUSCLE IMAGE SPECT MULT: CPT

## 2020-12-18 ENCOUNTER — RX RENEWAL (OUTPATIENT)
Age: 70
End: 2020-12-18

## 2021-04-24 ENCOUNTER — INPATIENT (INPATIENT)
Facility: HOSPITAL | Age: 71
LOS: 1 days | Discharge: HOME | DRG: 247 | End: 2021-04-26
Attending: STUDENT IN AN ORGANIZED HEALTH CARE EDUCATION/TRAINING PROGRAM | Admitting: STUDENT IN AN ORGANIZED HEALTH CARE EDUCATION/TRAINING PROGRAM
Payer: MEDICARE

## 2021-04-24 VITALS
SYSTOLIC BLOOD PRESSURE: 156 MMHG | OXYGEN SATURATION: 100 % | RESPIRATION RATE: 17 BRPM | TEMPERATURE: 98 F | HEIGHT: 62 IN | WEIGHT: 121.92 LBS | HEART RATE: 68 BPM | DIASTOLIC BLOOD PRESSURE: 76 MMHG

## 2021-04-24 DIAGNOSIS — I21.3 ST ELEVATION (STEMI) MYOCARDIAL INFARCTION OF UNSPECIFIED SITE: ICD-10-CM

## 2021-04-24 LAB
ALBUMIN SERPL ELPH-MCNC: 4.6 G/DL — SIGNIFICANT CHANGE UP (ref 3.3–5)
ALP SERPL-CCNC: 57 U/L — SIGNIFICANT CHANGE UP (ref 40–120)
ALT FLD-CCNC: 22 U/L — SIGNIFICANT CHANGE UP (ref 10–45)
ANION GAP SERPL CALC-SCNC: 13 MMOL/L — SIGNIFICANT CHANGE UP (ref 5–17)
APTT BLD: 27.2 SEC — LOW (ref 27.5–35.5)
AST SERPL-CCNC: 24 U/L — SIGNIFICANT CHANGE UP (ref 10–40)
BASOPHILS # BLD AUTO: 0.01 K/UL — SIGNIFICANT CHANGE UP (ref 0–0.2)
BASOPHILS NFR BLD AUTO: 0.1 % — SIGNIFICANT CHANGE UP (ref 0–2)
BILIRUB SERPL-MCNC: 0.5 MG/DL — SIGNIFICANT CHANGE UP (ref 0.2–1.2)
BUN SERPL-MCNC: 12 MG/DL — SIGNIFICANT CHANGE UP (ref 7–23)
CALCIUM SERPL-MCNC: 9.5 MG/DL — SIGNIFICANT CHANGE UP (ref 8.4–10.5)
CHLORIDE SERPL-SCNC: 98 MMOL/L — SIGNIFICANT CHANGE UP (ref 96–108)
CO2 SERPL-SCNC: 21 MMOL/L — LOW (ref 22–31)
CREAT SERPL-MCNC: 0.67 MG/DL — SIGNIFICANT CHANGE UP (ref 0.5–1.3)
EOSINOPHIL # BLD AUTO: 0 K/UL — SIGNIFICANT CHANGE UP (ref 0–0.5)
EOSINOPHIL NFR BLD AUTO: 0 % — SIGNIFICANT CHANGE UP (ref 0–6)
GLUCOSE SERPL-MCNC: 125 MG/DL — HIGH (ref 70–99)
HCT VFR BLD CALC: 36.9 % — LOW (ref 39–50)
HGB BLD-MCNC: 12.7 G/DL — LOW (ref 13–17)
IMM GRANULOCYTES NFR BLD AUTO: 0.5 % — SIGNIFICANT CHANGE UP (ref 0–1.5)
INR BLD: 1.06 RATIO — SIGNIFICANT CHANGE UP (ref 0.88–1.16)
LYMPHOCYTES # BLD AUTO: 0.77 K/UL — LOW (ref 1–3.3)
LYMPHOCYTES # BLD AUTO: 6.6 % — LOW (ref 13–44)
MCHC RBC-ENTMCNC: 34.4 GM/DL — SIGNIFICANT CHANGE UP (ref 32–36)
MCHC RBC-ENTMCNC: 34.8 PG — HIGH (ref 27–34)
MCV RBC AUTO: 101.1 FL — HIGH (ref 80–100)
MONOCYTES # BLD AUTO: 0.84 K/UL — SIGNIFICANT CHANGE UP (ref 0–0.9)
MONOCYTES NFR BLD AUTO: 7.1 % — SIGNIFICANT CHANGE UP (ref 2–14)
NEUTROPHILS # BLD AUTO: 10.07 K/UL — HIGH (ref 1.8–7.4)
NEUTROPHILS NFR BLD AUTO: 85.7 % — HIGH (ref 43–77)
NRBC # BLD: 0 /100 WBCS — SIGNIFICANT CHANGE UP (ref 0–0)
PLATELET # BLD AUTO: 202 K/UL — SIGNIFICANT CHANGE UP (ref 150–400)
POTASSIUM SERPL-MCNC: 3.9 MMOL/L — SIGNIFICANT CHANGE UP (ref 3.5–5.3)
POTASSIUM SERPL-SCNC: 3.9 MMOL/L — SIGNIFICANT CHANGE UP (ref 3.5–5.3)
PROT SERPL-MCNC: 7.4 G/DL — SIGNIFICANT CHANGE UP (ref 6–8.3)
PROTHROM AB SERPL-ACNC: 12.7 SEC — SIGNIFICANT CHANGE UP (ref 10.6–13.6)
RBC # BLD: 3.65 M/UL — LOW (ref 4.2–5.8)
RBC # FLD: 11.4 % — SIGNIFICANT CHANGE UP (ref 10.3–14.5)
SARS-COV-2 RNA SPEC QL NAA+PROBE: SIGNIFICANT CHANGE UP
SODIUM SERPL-SCNC: 132 MMOL/L — LOW (ref 135–145)
TROPONIN T, HIGH SENSITIVITY RESULT: 17 NG/L — SIGNIFICANT CHANGE UP (ref 0–51)
WBC # BLD: 11.75 K/UL — HIGH (ref 3.8–10.5)
WBC # FLD AUTO: 11.75 K/UL — HIGH (ref 3.8–10.5)

## 2021-04-24 PROCEDURE — 99223 1ST HOSP IP/OBS HIGH 75: CPT | Mod: GC

## 2021-04-24 PROCEDURE — 93010 ELECTROCARDIOGRAM REPORT: CPT

## 2021-04-24 PROCEDURE — 99291 CRITICAL CARE FIRST HOUR: CPT

## 2021-04-24 PROCEDURE — 71045 X-RAY EXAM CHEST 1 VIEW: CPT | Mod: 26

## 2021-04-24 RX ORDER — POTASSIUM CHLORIDE 20 MEQ
20 PACKET (EA) ORAL ONCE
Refills: 0 | Status: COMPLETED | OUTPATIENT
Start: 2021-04-24 | End: 2021-04-25

## 2021-04-24 RX ORDER — NITROGLYCERIN 6.5 MG
100 CAPSULE, EXTENDED RELEASE ORAL
Qty: 50 | Refills: 0 | Status: DISCONTINUED | OUTPATIENT
Start: 2021-04-24 | End: 2021-04-25

## 2021-04-24 RX ORDER — ASPIRIN/CALCIUM CARB/MAGNESIUM 324 MG
324 TABLET ORAL ONCE
Refills: 0 | Status: COMPLETED | OUTPATIENT
Start: 2021-04-24 | End: 2021-04-24

## 2021-04-24 RX ORDER — HEPARIN SODIUM 5000 [USP'U]/ML
3500 INJECTION INTRAVENOUS; SUBCUTANEOUS ONCE
Refills: 0 | Status: COMPLETED | OUTPATIENT
Start: 2021-04-24 | End: 2021-04-24

## 2021-04-24 RX ORDER — HEPARIN SODIUM 5000 [USP'U]/ML
INJECTION INTRAVENOUS; SUBCUTANEOUS
Qty: 25000 | Refills: 0 | Status: DISCONTINUED | OUTPATIENT
Start: 2021-04-24 | End: 2021-04-24

## 2021-04-24 RX ORDER — CHLORHEXIDINE GLUCONATE 213 G/1000ML
1 SOLUTION TOPICAL
Refills: 0 | Status: DISCONTINUED | OUTPATIENT
Start: 2021-04-24 | End: 2021-04-25

## 2021-04-24 RX ORDER — HEPARIN SODIUM 5000 [USP'U]/ML
700 INJECTION INTRAVENOUS; SUBCUTANEOUS
Qty: 25000 | Refills: 0 | Status: DISCONTINUED | OUTPATIENT
Start: 2021-04-24 | End: 2021-04-25

## 2021-04-24 RX ORDER — HEPARIN SODIUM 5000 [USP'U]/ML
3500 INJECTION INTRAVENOUS; SUBCUTANEOUS EVERY 6 HOURS
Refills: 0 | Status: DISCONTINUED | OUTPATIENT
Start: 2021-04-24 | End: 2021-04-25

## 2021-04-24 RX ORDER — ASPIRIN/CALCIUM CARB/MAGNESIUM 324 MG
81 TABLET ORAL DAILY
Refills: 0 | Status: DISCONTINUED | OUTPATIENT
Start: 2021-04-25 | End: 2021-04-26

## 2021-04-24 RX ORDER — TICAGRELOR 90 MG/1
90 TABLET ORAL EVERY 12 HOURS
Refills: 0 | Status: DISCONTINUED | OUTPATIENT
Start: 2021-04-25 | End: 2021-04-26

## 2021-04-24 RX ORDER — ATORVASTATIN CALCIUM 80 MG/1
80 TABLET, FILM COATED ORAL AT BEDTIME
Refills: 0 | Status: DISCONTINUED | OUTPATIENT
Start: 2021-04-24 | End: 2021-04-26

## 2021-04-24 RX ORDER — HEPARIN SODIUM 5000 [USP'U]/ML
3200 INJECTION INTRAVENOUS; SUBCUTANEOUS ONCE
Refills: 0 | Status: DISCONTINUED | OUTPATIENT
Start: 2021-04-24 | End: 2021-04-24

## 2021-04-24 RX ORDER — HEPARIN SODIUM 5000 [USP'U]/ML
3200 INJECTION INTRAVENOUS; SUBCUTANEOUS EVERY 6 HOURS
Refills: 0 | Status: DISCONTINUED | OUTPATIENT
Start: 2021-04-24 | End: 2021-04-24

## 2021-04-24 RX ORDER — TICAGRELOR 90 MG/1
180 TABLET ORAL ONCE
Refills: 0 | Status: COMPLETED | OUTPATIENT
Start: 2021-04-24 | End: 2021-04-24

## 2021-04-24 RX ADMIN — HEPARIN SODIUM 700 UNIT(S)/HR: 5000 INJECTION INTRAVENOUS; SUBCUTANEOUS at 21:48

## 2021-04-24 RX ADMIN — Medication 324 MILLIGRAM(S): at 21:15

## 2021-04-24 RX ADMIN — Medication 30 MICROGRAM(S)/MIN: at 22:55

## 2021-04-24 RX ADMIN — HEPARIN SODIUM 3500 UNIT(S): 5000 INJECTION INTRAVENOUS; SUBCUTANEOUS at 21:48

## 2021-04-24 RX ADMIN — TICAGRELOR 180 MILLIGRAM(S): 90 TABLET ORAL at 22:54

## 2021-04-24 NOTE — H&P ADULT - NSHPREVIEWOFSYSTEMS_GEN_ALL_CORE
Review of Systems:  Constitutional: No fever, No weight loss, good appetite/po intake  Head: No headache   Eyes: No blurry vision, No diplopia  Neuro: No tremors, No muscle weakness   Cardiovascular: No chest pain, No palpitations  Respiratory: No SOB, No cough  GI: No nausea, No vomiting, No diarrhea  : No dysuria, No hematuria  Skin: No rash  MSK: No joint pain   Psych: No depression Review of Systems:  Constitutional: No fever, No weight loss, good appetite/po intake  Head: No headache   Eyes: No blurry vision, No diplopia  Neuro: No tremors, No muscle weakness   Cardiovascular: chest pain, No palpitations  Respiratory: No SOB, No cough  GI: No nausea, No vomiting, No diarrhea  : No dysuria, No hematuria  Skin: No rash  MSK: No joint pain   Psych: No depression

## 2021-04-24 NOTE — H&P ADULT - HISTORY OF PRESENT ILLNESS
70y M w/ PMHx of CAD s/p CABG(1991) and POBA in 2012, HLD, HTN, seasonal allergies, hearing loss and BPH former smoker p/f active chest pain originally exertional in nature for the last few weeks, however starting yest evening having left sided pressure like non radiating pain at rest with dizziness w/o SOB or cough. At home had taken sublingual nitro x4 with some relief. Pt was scheduled for angio w/ Cardiologist Dr. Saldaña on Monday.    In the ED afebrile, HR in 50s-60s, BP 120s-160s, RR 15-17s satting 100% on RA, leukocytosis of 11.75, Hgb 12.7, .1, Na 132, bicarb 21, trop 17, clear lungs on CXR, EKG with changes from prior  given: heparin ggt with bolus, asa 324, brilinta 180, nitro drip, atorva 80,    in 10 Jul 2020 had nuclear stress test with nonspecific repolarization changes noted, LV normal in size, medium sized, mild defect in basal to mid anterolateral wall that is reversible c/w mild ischemia although improved with prone imaging, nml LV function on post stress gated wall motion analysis LVEF = 63%    most recent ECHO in Aug 2019: mild to mod MR, minimal AR, concentric LV, nml LV sys and diastolic fxn, mild to moderate TR, mild PA, borderline pulm HTN     home medications include confirmed Aspirin 81, Ixiscsdscsve48 MG, Clopidogrel 75 MG, losartan 12.5 BID, Metoprolol Succinate ER 12.5 MG, Ranolazine ER 1000 MG BID, Nitroglycerin 0.4 MG Sublingual     possible Nasacort Allergy 24HR 55 MCG/ACT Nasal Aerosol  cyclobenzaprine  clobetasol  meloxicam, methylprenisolone, tamsulosin 70y M w/ PMHx of CAD s/p CABG(1991) and POBA in 2012, HLD, HTN, seasonal allergies, hearing loss, chronic low back pain and BPH former smoker p/f active chest pain originally exertional in nature for the last few weeks, however starting yest evening having left sided pressure like non radiating pain at rest with dizziness w/o SOB or cough. At home had taken sublingual nitro x4 with some relief. Denies PND, orthopnea, pleuritic chest pain, hx of blood clots. Pt was scheduled for angio w/ Cardiologist Dr. Saldaña on Monday.    Of note patient suffers from chronic low back pain not amenable to medications so underwent epidural injection on 4/23 at [facility] with a pain specialist. He had not been taking his plavix from the 16th and had restarted it on the 24th.     In the ED afebrile, HR in 50s-60s, BP 120s-160s, RR 15-17s satting 100% on RA, leukocytosis of 11.75, Hgb 12.7, .1, Na 132, bicarb 21, trop 17, clear lungs on CXR, EKG with changes from prior  given: heparin ggt with bolus, asa 324, brilinta 180, nitro drip, atorva 80,    in 10 Jul 2020 had nuclear stress test with nonspecific repolarization changes noted, LV normal in size, medium sized, mild defect in basal to mid anterolateral wall that is reversible c/w mild ischemia although improved with prone imaging, nml LV function on post stress gated wall motion analysis LVEF = 63%    most recent ECHO in Aug 2019: mild to mod MR, minimal AR, concentric LV, nml LV sys and diastolic fxn, mild to moderate TR, mild AL, borderline pulm HTN     home medications include confirmed Aspirin 81, [id number] MG, Clopidogrel 75 MG, losartan 12.5 BID, Metoprolol Succinate ER 12.5 MG, Ranolazine ER 1000 MG BID, Nitroglycerin 0.4 MG Sublingual

## 2021-04-24 NOTE — H&P ADULT - NSICDXPASTSURGICALHX_GEN_ALL_CORE_FT
PAST SURGICAL HISTORY:  After cataract, bilateral     H/O coronary angioplasty     History of arthroscopy of knee right    Hx of CABG

## 2021-04-24 NOTE — H&P ADULT - NSHPPHYSICALEXAM_GEN_ALL_CORE
Vital Signs (24 Hrs):  T(C): 36.8 (21 @ 23:45), Max: 36.8 (21 @ 23:45)  HR: 60 (21 @ 23:45) (58 - 68)  BP: 114/59 (21 @ 23:45) (114/59 - 156/76)  RR: 20 (21 @ 23:45) (15 - 20)  SpO2: 99% (21 @ 23:45) (99% - 100%)  Wt(kg): --  Daily Height in cm: 157.48 (2021 20:31)    Daily Weight in k.7 (2021 23:45)    I&O's Summary    PHYSICAL EXAM  GENERAL: NAD, lying comfortably in bed   HEAD:  Atraumatic, Normocephalic  EYES: EOMI b/l, PERRLA b/l, conjunctiva and sclera clear  NECK: Supple, No JVD, No LAD   CHEST/LUNG: Clear to auscultation bilaterally; No wheeze or ronchi  HEART: Regular rate and rhythm; S1 and S2 present, No murmurs, rubs, or gallops  ABDOMEN: Soft, Nontender, Nondistended; Bowel sounds present  EXTREMITIES:  2+ Peripheral Pulses, No clubbing, cyanosis, or edema  NEURO: AAOx3, non-focal   SKIN: No rashes or lesions Vital Signs (24 Hrs):  T(C): 36.8 (21 @ 23:45), Max: 36.8 (21 @ 23:45)  HR: 60 (21 @ 23:45) (58 - 68)  BP: 114/59 (21 @ 23:45) (114/59 - 156/76)  RR: 20 (21 @ 23:45) (15 - 20)  SpO2: 99% (21 @ 23:45) (99% - 100%)  Wt(kg): --  Daily Height in cm: 157.48 (2021 20:31)    Daily Weight in k.7 (2021 23:45)    I&O's Summary    PHYSICAL EXAM  GENERAL: NAD, lying comfortably in bed   HEAD:  Atraumatic, Normocephalic  EYES: EOMI b/l, PERRLA b/l, conjunctiva and sclera clear  NECK: Supple, No JVD, No LAD   CHEST/LUNG: CABG scar, Clear to auscultation bilaterally; No wheeze or ronchi  HEART: Regular rate and rhythm; S1 and S2 present, No murmurs, rubs, or gallops  ABDOMEN: Soft, Nontender, Nondistended; Bowel sounds present  EXTREMITIES:  2+ Peripheral Pulses, No clubbing, cyanosis, or edema  NEURO: AAOx3, non-focal   SKIN: No rashes or lesions

## 2021-04-24 NOTE — ED PROVIDER NOTE - OBJECTIVE STATEMENT
70y M w/ PMHx of CAD, CABG(1991), POBA in 2012, hyperlipidemia, hypertension, former smoker presenting from home with active chest pain. 70y M w/ PMHx of CAD, CABG(1991), POBA in 2012, hyperlipidemia, hypertension, former smoker presenting from home with active chest pain. Patient endorses exertional chest pain x1-2 weeks, was scheduled for angio w/ Cardiologist Dr. Streeter on Monday. States yesterday evening, chest pain occurred at rest, was intermittent in nature, L-sided described as pressure, non-radiating. Endorses dizziness with his chest pain. Denies vision changes, back pain, n/v, focal weakness or numbness. Took sublingual nitro x4 this evening, endorsed temporary relief with recurrence of his pain shortly after.

## 2021-04-24 NOTE — ED PROVIDER NOTE - PSH
After cataract, bilateral    H/O coronary angioplasty    History of arthroscopy of knee  right  Hx of CABG

## 2021-04-24 NOTE — ED ADULT NURSE NOTE - OBJECTIVE STATEMENT
complaining of chest pain x1-2 weeks and worse today. Pt states, "I had chest pain for 1-2 weeks ago with physical activity and now its worse at rest as well. I used my nitro sublingual with no relief. I had stopped my Plavix on 04/16/2021 to get epidural injection and restarted it this morning." Pt endorses chest pain at present. Pt denies headache, blurred vision, lightheadedness, dizziness, SOB, abdominal pain, N/V/D urinary symptoms, numbness and tingling at present. Pt has history of bypass and stent placement. Cardiac monitor placed at bedside.

## 2021-04-24 NOTE — CHART NOTE - NSCHARTNOTEFT_GEN_A_CORE
Padua Prediction Score for VTE Risk within 24hours of admission:    Active malignancy:                                                    [  ] YES +3, [ x ] NO   Previous VTE (Excluding Superficial Vein Thrombosis): [  ] YES +3, [ x ] NO  Reduced mobility:                                                     [  ] YES +3, [ x ] NO  Already known thrombophilic condition:                     [  ] YES +3, [x  ] NO  Recent (</=1 month trauma and/or surgery):             [  ] YES +2, [ x ] NO  Elderly age (>/=70):                                                  [  ] YES +1, [ x ] NO  Heart and/or Respiratory Failure:                               [  ] YES +1, [ x ] NO   Acute MI and/or ischemic CVA:                                  [ x ] YES +1, [  ] NO   Acute infection and/or rheumatologic disorder:          [  ] YES +1, [ x ] NO   BMI>/= 30:                                                              [  ] YES +1, [ x ] NO   Ongoing hormonal treatment:                                   [  ] YES +1, [ x ] NO    Total Score: [ 1 ]  points    [ x ] Padua Score <  3: Low Risk of VTE         - Chemical Thromboprophylaxis should be considered on case-by-case basis  [  ] Padua Score >/= 4: High Risk of VTE         - Chemical Thromboprophylaxis is recommended for nonpregnant patients without contraindications (Major bleeding, thrombocytopenia) who are >/=  18 years of age                         VTE Prophylaxis Recommendations:  Mechanical Pneumatic Compression Devices                                [  ]  Yes,  [  ] No, Contraindicated    Chemical VTE Prophylaxis (Heparin/ Lovenox/ Fondaparinux)        [ x  ] Yes,  [  ] No              [ ] Contraindicated, because _____              [ x] Already receiving Systemic Anticoagulation: Heparin gtt for ACS

## 2021-04-24 NOTE — ED PROVIDER NOTE - ATTENDING CONTRIBUTION TO CARE
I performed a history and physical exam of the patient and discussed their management with the resident. I reviewed the resident's note and agree with the documented findings and plan of care. My medical decision making and observations are found above.    70M hx cad, cabg, stents (remote) p/w substernal chest pressure x 1w, worse today. A/w sob, nausea, diaphoresis, dizziness, weakness, feels like prior MI. +exertional. nonpleuritic. Took 3 baby asa pta. Has active cp in Er. Cards immediately called for cath consult given ekg changes. On exam, nontoxic appearing, nad. cv rrr no m/r/g lungs ctabl no resp distress, abd soft, ntnd. no rigidity/distension/pulsatile masses. 2+ disatl pulses equal. no lower extremity swelling. 5/5 distal strength. skin WNL. Concern for STEMI vs unstable angina. Will give dapt and heparin drip, cards consult, TBA. Not c/w dissection/infectious etiology/dvt/pe.

## 2021-04-24 NOTE — ED ADULT TRIAGE NOTE - CHIEF COMPLAINT QUOTE
angina began about 1-2 weeks ago with physical activity; worsening now; angina at rest; using NTG SL; was off Plavix to get epidural injection; restarted this am

## 2021-04-24 NOTE — ED PROVIDER NOTE - PMH
CAD (coronary artery disease)    Former cigarette smoker    HTN (hypertension)    Hypercholesteremia

## 2021-04-24 NOTE — ED PROVIDER NOTE - CLINICAL SUMMARY MEDICAL DECISION MAKING FREE TEXT BOX
70y M w/ PMHx of CAD, CABG(1991), POBA in 2012, hyperlipidemia, hypertension, former smoker presenting from home with active chest pain arrived w/ EKG findings c/f anterior STEMI. Cards consulted urgently, ASA, Heparin, labs, trop, CXR. Will repeat EKG.

## 2021-04-24 NOTE — H&P ADULT - NSHPLABSRESULTS_GEN_ALL_CORE
.  LABS:                         12.7   11.75 )-----------( 202      ( 24 Apr 2021 21:43 )             36.9     04-24    132<L>  |  98  |  12  ----------------------------<  125<H>  3.9   |  21<L>  |  0.67    Ca    9.5      24 Apr 2021 21:43    TPro  7.4  /  Alb  4.6  /  TBili  0.5  /  DBili  x   /  AST  24  /  ALT  22  /  AlkPhos  57  04-24    PT/INR - ( 24 Apr 2021 21:43 )   PT: 12.7 sec;   INR: 1.06 ratio         PTT - ( 24 Apr 2021 21:43 )  PTT:27.2 sec          RADIOLOGY, EKG & ADDITIONAL TESTS: Reviewed.     < from: Xray Chest 1 View- PORTABLE-Urgent (Xray Chest 1 View- PORTABLE-Urgent .) (04.24.21 @ 22:12) >      EXAM:  XR CHEST PORTABLE URGENT 1V                            PROCEDURE DATE:  04/24/2021            INTERPRETATION:  CLINICAL INFORMATION: Chest pain.    EXAM: Frontal radiograph of the chest.    COMPARISON: None.    FINDINGS:  The lungs are clear.  There is no pleural effusion or pneumothorax.  Sternotomy. The heart size is normal. Superior mediastinal surgical clips.  The visualized osseous and soft tissue structures demonstrate no acute pathology.    IMPRESSION:  Clear lungs.    < end of copied text >

## 2021-04-24 NOTE — ED PROVIDER NOTE - PHYSICAL EXAMINATION
Physical Exam:  Gen: non-toxic appearing, NAD  Lung: CTAB, no respiratory distress, no wheezes/rhonchi/rales B/L, speaking in full sentences  CV: RRR, no murmurs, rubs or gallops  Abd: soft, NT, ND, no guarding  MSK: no visible deformities, ROM normal in UE/LE, no back pain  Neuro: No focal sensory or motor deficits  Skin: Warm, well perfused, no leg swelling  Psych: normal affect, calm  Mehnaz Ramos D.O. -Resident

## 2021-04-24 NOTE — H&P ADULT - NSICDXPASTMEDICALHX_GEN_ALL_CORE_FT
PAST MEDICAL HISTORY:  CAD (coronary artery disease)     Former cigarette smoker     HTN (hypertension)     Hypercholesteremia

## 2021-04-24 NOTE — ED PROVIDER NOTE - NS ED ROS FT
CONSTITUTIONAL: no fever, no chills, + lightheadedness  EYES: no visual changes, no eye pain  CV: + chest pain, no palpitations  RESP: No SOB, no cough  GI: No n/v, no abd pain   : no flank pain  MSK: no back pain, no extremity pain  NEURO: no headache, no focal weakness, no decreased sensation/paresthesias

## 2021-04-24 NOTE — H&P ADULT - ASSESSMENT
70y M w/ PMHx of CAD s/p CABG(1991) and PATTI to LIMA in 2013 HLD, HTN, seasonal allergies, hearing loss and BPH p/w active CP likely unstable angina with EKG changes now improved on nitro drip and admitted to the CICU for further monitoring    #Neuro:  ao3 at his baseline  MARTHA CTM    seasonal allergies and hearing loss: MARTHA CTM    #Cardiac   Unstable Angina with EKG changes neg CE: likely in the setting of tight occlusion of coronaries arteries  initial troponin negative  asa/brillinta loaded, started on heparin drip  trend Cathryn, serial EKGs  continue nitro drip for symptom relief    CAD s/p CABG in 1991  most recent cath in 2013: with PATTI to LIMA (aorta to mid LAD)  Jul 2020 nuclear stress test with nonspecific repolarization changes noted, LV normal in size, medium sized, mild defect in basal to mid anterolateral wall that is reversible c/w mild ischemia although improved with prone imaging, nml LV function on post stress gated wall motion analysis LVEF = 63%  most recent ECHO in Aug 2019: mild to mod MR, minimal AR, concentric LV, nml LV sys and diastolic fxn, mild to moderate TR, mild UT, borderline pulm HTN   was due for repeat Cath on Monday  continue asa brillinta statin and BB ace  repeat ECHO    HTN: see above, will monitor while on nitro drip,     Pulm: MARTHA, CTM  titrate O2 to maintain sat >88%    GI:  MARTHA CTM  DASH TLC diet    Renal: NA, CTM  electrolyte abnormalites noted  keep K>4, and Mg >2    URO: hx of BPH, follows with outpatient urologist not on flomax, CTM for urinary retention    ID: leukocytosis may be stress related, afebrile  MARTHA CTM    Endo: TSH, A1c in the AM    Heme:  patient with mild macrocytic anemia 12.7, outpatient Baseline 13.8  B12 and folate in the AM  full AC with Hep drip    GOC: full 70y M w/ PMHx of CAD s/p CABG(1991) and PATTI to LIMA in 2013, chronic back pain s/p recent epidural injection,  HLD, HTN, seasonal allergies, hearing loss and BPH p/w active CP likely unstable angina with EKG changes now improved on nitro drip and admitted to the CICU for further monitoring    #Neuro:  ao3 at his baseline    chronic low back pain  previously on cyclobenzaprine, meloxicam, methylprenisolone now s/p epidural injection on 4/23  pain is controlled at the moment, continue to monitor    seasonal allergies and hearing loss: no longer taking nasal spray, MARTHA CTM    #Cardiac   Unstable Angina with EKG changes neg CE: likely in the setting of tight occlusion of coronaries arteries  initial troponin negative  asa/brillinta loaded, started on heparin drip  trend Cathryn, serial EKGs  continue nitro drip for symptom relief    CAD s/p CABG in 1991  most recent cath in 2013: with PATTI to LIMA (aorta to mid LAD)  Jul 2020 nuclear stress test with nonspecific repolarization changes noted, LV normal in size, medium sized, mild defect in basal to mid anterolateral wall that is reversible c/w mild ischemia although improved with prone imaging, nml LV function on post stress gated wall motion analysis LVEF = 63%  most recent ECHO in Aug 2019: mild to mod MR, minimal AR, concentric LV, nml LV sys and diastolic fxn, mild to moderate TR, mild [place], borderline pulm HTN   was due for repeat Cath on Monday  continue asa brillinta statin and BB ace  repeat ECHO    HTN: see above, will monitor while on nitro drip,     Pulm: MARTHA, CTM  titrate O2 to maintain sat >88%    GI:  MARTHA CTM  DASH TLC diet    Renal: NA, CTM  electrolyte abnormalites noted  keep K>4, and Mg >2    URO: hx of BPH, follows with outpatient urologist not on flomax as it did not work for him, CTM for urinary retention    ID: leukocytosis may be stress related, afebrile  MARTHA CTM    Endo: TSH, A1c in the AM    Heme:  patient with mild macrocytic anemia 12.7, outpatient Baseline 13.8  B12 and folate in the AM  full AC with Hep drip    GOC: full code

## 2021-04-25 LAB
A1C WITH ESTIMATED AVERAGE GLUCOSE RESULT: 5.4 % — SIGNIFICANT CHANGE UP (ref 4–5.6)
ALBUMIN SERPL ELPH-MCNC: 3.9 G/DL — SIGNIFICANT CHANGE UP (ref 3.3–5)
ALBUMIN SERPL ELPH-MCNC: 3.9 G/DL — SIGNIFICANT CHANGE UP (ref 3.3–5)
ALP SERPL-CCNC: 46 U/L — SIGNIFICANT CHANGE UP (ref 40–120)
ALP SERPL-CCNC: 51 U/L — SIGNIFICANT CHANGE UP (ref 40–120)
ALT FLD-CCNC: 20 U/L — SIGNIFICANT CHANGE UP (ref 10–45)
ALT FLD-CCNC: 21 U/L — SIGNIFICANT CHANGE UP (ref 10–45)
ANION GAP SERPL CALC-SCNC: 11 MMOL/L — SIGNIFICANT CHANGE UP (ref 5–17)
ANION GAP SERPL CALC-SCNC: 13 MMOL/L — SIGNIFICANT CHANGE UP (ref 5–17)
APTT BLD: 131.6 SEC — CRITICAL HIGH (ref 27.5–35.5)
AST SERPL-CCNC: 18 U/L — SIGNIFICANT CHANGE UP (ref 10–40)
AST SERPL-CCNC: 18 U/L — SIGNIFICANT CHANGE UP (ref 10–40)
BASOPHILS # BLD AUTO: 0.01 K/UL — SIGNIFICANT CHANGE UP (ref 0–0.2)
BASOPHILS NFR BLD AUTO: 0.1 % — SIGNIFICANT CHANGE UP (ref 0–2)
BILIRUB SERPL-MCNC: 0.4 MG/DL — SIGNIFICANT CHANGE UP (ref 0.2–1.2)
BILIRUB SERPL-MCNC: 0.6 MG/DL — SIGNIFICANT CHANGE UP (ref 0.2–1.2)
BLD GP AB SCN SERPL QL: NEGATIVE — SIGNIFICANT CHANGE UP
BUN SERPL-MCNC: 10 MG/DL — SIGNIFICANT CHANGE UP (ref 7–23)
BUN SERPL-MCNC: 11 MG/DL — SIGNIFICANT CHANGE UP (ref 7–23)
CALCIUM SERPL-MCNC: 8.4 MG/DL — SIGNIFICANT CHANGE UP (ref 8.4–10.5)
CALCIUM SERPL-MCNC: 8.7 MG/DL — SIGNIFICANT CHANGE UP (ref 8.4–10.5)
CHLORIDE SERPL-SCNC: 101 MMOL/L — SIGNIFICANT CHANGE UP (ref 96–108)
CHLORIDE SERPL-SCNC: 103 MMOL/L — SIGNIFICANT CHANGE UP (ref 96–108)
CHOLEST SERPL-MCNC: 113 MG/DL — SIGNIFICANT CHANGE UP
CK MB BLD-MCNC: 3 % — SIGNIFICANT CHANGE UP (ref 0–3.5)
CK MB CFR SERPL CALC: 3 NG/ML — SIGNIFICANT CHANGE UP (ref 0–6.7)
CK MB CFR SERPL CALC: 3.7 NG/ML — SIGNIFICANT CHANGE UP (ref 0–6.7)
CK MB CFR SERPL CALC: 4.2 NG/ML — SIGNIFICANT CHANGE UP (ref 0–6.7)
CK SERPL-CCNC: 139 U/L — SIGNIFICANT CHANGE UP (ref 30–200)
CK SERPL-CCNC: 75 U/L — SIGNIFICANT CHANGE UP (ref 30–200)
CK SERPL-CCNC: 96 U/L — SIGNIFICANT CHANGE UP (ref 30–200)
CO2 SERPL-SCNC: 19 MMOL/L — LOW (ref 22–31)
CO2 SERPL-SCNC: 21 MMOL/L — LOW (ref 22–31)
COVID-19 NUCLEOCAPSID GAM AB INTERP: NEGATIVE — SIGNIFICANT CHANGE UP
COVID-19 NUCLEOCAPSID TOTAL GAM ANTIBODY RESULT: 0.09 INDEX — SIGNIFICANT CHANGE UP
CREAT SERPL-MCNC: 0.55 MG/DL — SIGNIFICANT CHANGE UP (ref 0.5–1.3)
CREAT SERPL-MCNC: 0.62 MG/DL — SIGNIFICANT CHANGE UP (ref 0.5–1.3)
EOSINOPHIL # BLD AUTO: 0 K/UL — SIGNIFICANT CHANGE UP (ref 0–0.5)
EOSINOPHIL NFR BLD AUTO: 0 % — SIGNIFICANT CHANGE UP (ref 0–6)
ESTIMATED AVERAGE GLUCOSE: 108 MG/DL — SIGNIFICANT CHANGE UP (ref 68–114)
GLUCOSE SERPL-MCNC: 122 MG/DL — HIGH (ref 70–99)
GLUCOSE SERPL-MCNC: 161 MG/DL — HIGH (ref 70–99)
HCT VFR BLD CALC: 35.9 % — LOW (ref 39–50)
HDLC SERPL-MCNC: 56 MG/DL — SIGNIFICANT CHANGE UP
HGB BLD-MCNC: 12.2 G/DL — LOW (ref 13–17)
IMM GRANULOCYTES NFR BLD AUTO: 0.5 % — SIGNIFICANT CHANGE UP (ref 0–1.5)
LIPID PNL WITH DIRECT LDL SERPL: 49 MG/DL — SIGNIFICANT CHANGE UP
LYMPHOCYTES # BLD AUTO: 0.81 K/UL — LOW (ref 1–3.3)
LYMPHOCYTES # BLD AUTO: 7.3 % — LOW (ref 13–44)
MAGNESIUM SERPL-MCNC: 2 MG/DL — SIGNIFICANT CHANGE UP (ref 1.6–2.6)
MAGNESIUM SERPL-MCNC: 2.1 MG/DL — SIGNIFICANT CHANGE UP (ref 1.6–2.6)
MCHC RBC-ENTMCNC: 34 GM/DL — SIGNIFICANT CHANGE UP (ref 32–36)
MCHC RBC-ENTMCNC: 34.6 PG — HIGH (ref 27–34)
MCV RBC AUTO: 101.7 FL — HIGH (ref 80–100)
MONOCYTES # BLD AUTO: 0.79 K/UL — SIGNIFICANT CHANGE UP (ref 0–0.9)
MONOCYTES NFR BLD AUTO: 7.1 % — SIGNIFICANT CHANGE UP (ref 2–14)
NEUTROPHILS # BLD AUTO: 9.46 K/UL — HIGH (ref 1.8–7.4)
NEUTROPHILS NFR BLD AUTO: 85 % — HIGH (ref 43–77)
NON HDL CHOLESTEROL: 57 MG/DL — SIGNIFICANT CHANGE UP
NRBC # BLD: 0 /100 WBCS — SIGNIFICANT CHANGE UP (ref 0–0)
PHOSPHATE SERPL-MCNC: 2.2 MG/DL — LOW (ref 2.5–4.5)
PHOSPHATE SERPL-MCNC: 2.3 MG/DL — LOW (ref 2.5–4.5)
PLATELET # BLD AUTO: 189 K/UL — SIGNIFICANT CHANGE UP (ref 150–400)
POTASSIUM SERPL-MCNC: 3.9 MMOL/L — SIGNIFICANT CHANGE UP (ref 3.5–5.3)
POTASSIUM SERPL-MCNC: 4.2 MMOL/L — SIGNIFICANT CHANGE UP (ref 3.5–5.3)
POTASSIUM SERPL-SCNC: 3.9 MMOL/L — SIGNIFICANT CHANGE UP (ref 3.5–5.3)
POTASSIUM SERPL-SCNC: 4.2 MMOL/L — SIGNIFICANT CHANGE UP (ref 3.5–5.3)
PROT SERPL-MCNC: 6.3 G/DL — SIGNIFICANT CHANGE UP (ref 6–8.3)
PROT SERPL-MCNC: 6.3 G/DL — SIGNIFICANT CHANGE UP (ref 6–8.3)
RBC # BLD: 3.53 M/UL — LOW (ref 4.2–5.8)
RBC # FLD: 11.8 % — SIGNIFICANT CHANGE UP (ref 10.3–14.5)
RH IG SCN BLD-IMP: POSITIVE — SIGNIFICANT CHANGE UP
SARS-COV-2 IGG+IGM SERPL QL IA: 0.09 INDEX — SIGNIFICANT CHANGE UP
SARS-COV-2 IGG+IGM SERPL QL IA: NEGATIVE — SIGNIFICANT CHANGE UP
SODIUM SERPL-SCNC: 133 MMOL/L — LOW (ref 135–145)
SODIUM SERPL-SCNC: 135 MMOL/L — SIGNIFICANT CHANGE UP (ref 135–145)
TRIGL SERPL-MCNC: 37 MG/DL — SIGNIFICANT CHANGE UP
TROPONIN T, HIGH SENSITIVITY RESULT: 19 NG/L — SIGNIFICANT CHANGE UP (ref 0–51)
TROPONIN T, HIGH SENSITIVITY RESULT: 28 NG/L — SIGNIFICANT CHANGE UP (ref 0–51)
TSH SERPL-MCNC: 1.78 UIU/ML — SIGNIFICANT CHANGE UP (ref 0.27–4.2)
WBC # BLD: 11.13 K/UL — HIGH (ref 3.8–10.5)
WBC # FLD AUTO: 11.13 K/UL — HIGH (ref 3.8–10.5)

## 2021-04-25 PROCEDURE — 92937 PRQ TRLUML REVSC CAB GRF 1: CPT | Mod: RC

## 2021-04-25 PROCEDURE — 93010 ELECTROCARDIOGRAM REPORT: CPT | Mod: 76

## 2021-04-25 PROCEDURE — 93458 L HRT ARTERY/VENTRICLE ANGIO: CPT | Mod: 26,59

## 2021-04-25 PROCEDURE — 93306 TTE W/DOPPLER COMPLETE: CPT | Mod: 26

## 2021-04-25 PROCEDURE — 99291 CRITICAL CARE FIRST HOUR: CPT

## 2021-04-25 RX ORDER — HEPARIN SODIUM 5000 [USP'U]/ML
5000 INJECTION INTRAVENOUS; SUBCUTANEOUS EVERY 8 HOURS
Refills: 0 | Status: DISCONTINUED | OUTPATIENT
Start: 2021-04-26 | End: 2021-04-26

## 2021-04-25 RX ORDER — LOSARTAN POTASSIUM 100 MG/1
12.5 TABLET, FILM COATED ORAL DAILY
Refills: 0 | Status: DISCONTINUED | OUTPATIENT
Start: 2021-04-26 | End: 2021-04-26

## 2021-04-25 RX ADMIN — HEPARIN SODIUM 5.5 UNIT(S)/HR: 5000 INJECTION INTRAVENOUS; SUBCUTANEOUS at 04:56

## 2021-04-25 RX ADMIN — TICAGRELOR 90 MILLIGRAM(S): 90 TABLET ORAL at 05:13

## 2021-04-25 RX ADMIN — ATORVASTATIN CALCIUM 80 MILLIGRAM(S): 80 TABLET, FILM COATED ORAL at 21:14

## 2021-04-25 RX ADMIN — CHLORHEXIDINE GLUCONATE 1 APPLICATION(S): 213 SOLUTION TOPICAL at 07:07

## 2021-04-25 RX ADMIN — Medication 20 MILLIEQUIVALENT(S): at 00:40

## 2021-04-25 RX ADMIN — CHLORHEXIDINE GLUCONATE 1 APPLICATION(S): 213 SOLUTION TOPICAL at 01:10

## 2021-04-25 RX ADMIN — Medication 30 MICROGRAM(S)/MIN: at 00:41

## 2021-04-25 RX ADMIN — Medication 62.5 MILLIMOLE(S): at 05:13

## 2021-04-25 RX ADMIN — TICAGRELOR 90 MILLIGRAM(S): 90 TABLET ORAL at 17:46

## 2021-04-25 RX ADMIN — Medication 81 MILLIGRAM(S): at 11:43

## 2021-04-25 NOTE — CHART NOTE - NSCHARTNOTEFT_GEN_A_CORE
CCU Transfer Note    Transfer from: CCU    Transfer to: ( x ) Telemetry   [address]    Accepting Physician: Dr. Dean     Signout given to: Hospitalist, MAR, and floor ACP     CCU COURSE:  70y M w/ PMHx of CAD s/p CABG(1991) and POBA in 2012, HLD, HTN, seasonal allergies, hearing loss, chronic low back pain and BPH former smoker p/f active chest pain originally exertional in nature for the last few weeks, however starting yest evening having left sided pressure like non radiating pain at rest with dizziness w/o SOB or cough. At home had taken sublingual nitro x4 with some relief. Denies PND, orthopnea, pleuritic chest pain, hx of blood clots. Pt was scheduled for angio w/ Cardiologist Dr. Saldaña on Monday.  Of note patient suffers from chronic low back pain not amenable to medications so underwent epidural injection on 4/23 at [facility] with a pain specialist. He had not been taking his plavix from the 16th and had restarted it on the 24th.   In the ED afebrile, HR in 50s-60s, BP 120s-160s, RR 15-17s satting 100% on RA, leukocytosis of 11.75, Hgb 12.7, .1, Na 132, bicarb 21, trop 17, clear lungs on CXR, EKG with changes from prior. Given: heparin ggt with bolus, asa 324, brilinta 180, nitro drip, atorva 80. Pt admitted to CICU for unstable angina and nitro gtt for continued CP. s/p PCI 4/25 via right groin today with PATTI x1 to SVG-RPDA graft. Sheath removed without complication.       PAST MEDICAL & SURGICAL HISTORY:  CAD (coronary artery disease)    Hypercholesteremia    HTN (hypertension)    Former cigarette smoker    Hx of CABG    History of arthroscopy of knee  right    After cataract, bilateral    H/O coronary angioplasty        Vital Signs Last 24 Hrs  T(C): 36.9 (25 Apr 2021 19:00), Max: 36.9 (25 Apr 2021 19:00)  T(F): 98.5 (25 Apr 2021 19:00), Max: 98.5 (25 Apr 2021 19:00)  HR: 56 (25 Apr 2021 20:00) (50 - 68)  BP: 129/64 (25 Apr 2021 20:00) (90/44 - 152/73)  BP(mean): 85 (25 Apr 2021 20:00) (59 - 109)  RR: 12 (25 Apr 2021 20:00) (10 - 26)  SpO2: 98% (25 Apr 2021 20:00) (97% - 100%)  I&O's Summary    24 Apr 2021 07:01  -  25 Apr 2021 07:00  --------------------------------------------------------  IN: 488.5 mL / OUT: 1925 mL / NET: -1436.5 mL    25 Apr 2021 07:01  -  25 Apr 2021 20:55  --------------------------------------------------------  IN: 302.5 mL / OUT: 1325 mL / NET: -1022.5 mL        MEDICATIONS  (STANDING):  aspirin enteric coated 81 milliGRAM(s) Oral daily  atorvastatin 80 milliGRAM(s) Oral at bedtime  ticagrelor 90 milliGRAM(s) Oral every 12 hours          CARDIAC MARKERS ( 25 Apr 2021 12:16 )  x     / x     / 75 U/L / x     / 3.0 ng/mL  CARDIAC MARKERS ( 25 Apr 2021 03:20 )  x     / x     / 96 U/L / x     / 3.7 ng/mL  CARDIAC MARKERS ( 24 Apr 2021 21:43 )  x     / x     / 139 U/L / x     / 4.2 ng/mL                          12.2   11.13 )-----------( 189      ( 25 Apr 2021 03:20 )             35.9     04-25    133<L>  |  101  |  10  ----------------------------<  161<H>  3.9   |  19<L>  |  0.55    Ca    8.4      25 Apr 2021 12:16  Phos  2.3     04-25  Mg     2.0     04-25    TPro  6.3  /  Alb  3.9  /  TBili  0.6  /  DBili  x   /  AST  18  /  ALT  21  /  AlkPhos  46  04-25    PT/INR - ( 24 Apr 2021 21:43 )   PT: 12.7 sec;   INR: 1.06 ratio         PTT - ( 25 Apr 2021 03:20 )  PTT:131.6 sec      Telemetry: Sinus gurinder 50s     Echo: < from: Transthoracic Echocardiogram (04.25.21 @ 09:43) >  Conclusions:  1. Normal mitral valve. Mild-moderate mitral regurgitation.  2. Calcified trileaflet aortic valve with normal opening.  Minimal aortic regurgitation.  3. Normal left ventricular systolic function. No segmental  wall motion abnormalities.  4. Indeterminate diastolic function.  5. Normal right ventricular size and function.        Cardiac Cath: < from: Cardiac Cath Lab - Adult (04.25.21 @ 08:38) >    SUMMARY:  1ST LESION INTERVENTIONS: A successful balloon angioplasty with stent was  performed on the 95 % lesion in the distal third of the saphenous vein  graft from the aorta to the right posterior descending. Following  intervention there was a 1 % residual stenosis.  DIAGNOSTIC RECOMMENDATIONS: PCI of distal SVG to RPDA for treatment of  unstable angina.  INTERVENTIONAL RECOMMENDATIONS: Continue aspirin, 81 mg, PO, daily. Add  ticagrelor 90 mg twice daily. Patient management should include aggressive  medical therapy.          ASSESSMENT & PLAN:   70y M w/ PMHx of CAD s/p CABG(1991) and PATTI to LIMA in 2013, HTN, HLD p/w unstable angina, s/p nitro gtt and LHC 4/25 w/ PATTI toS VG-RPDA graft.     #Neuro:   No active issues, hx of chronic low back pain (s/p epidural injection 4/23)  aaox 4     #Cardiac:   Unstable angina   - s/p nitro gtt 4/24, weaned off 4/25 after cath   - continue DAPT with ASA and Brilinta  - continue high intensity statin therapy   - s/p PATTI x1 and POBA to SVG-RPDA graft via right femoral artery   - TTE revealing preserved LVSF with no WMAs  - Restart BB when HR able to tolerate   - Restart ARB in the AM, SCr stable post PCI   - OOB as tolerated     #Resp: No active issues   Sat % on room air     # GI: No active issues   DASH TLC diet    # Renal : Cr stable   - keep K>4, and Mg >2    # Heme:  No active issues. H/Hstable post PCI  Start HSQ in the AM for DVT ppx if groin stable           FOR FOLLOW UP:  [] restart BB when HR is able to tolerate  [] groin checks per protocol       Kelly MEJIA [phone]

## 2021-04-25 NOTE — PROGRESS NOTE ADULT - ASSESSMENT
70y M w/ PMHx of CAD s/p CABG(1991) and PATTI to LIMA in 2013, chronic back pain s/p recent epidural injection,  HLD, HTN, seasonal allergies, hearing loss and BPH p/w active CP likely unstable angina with EKG changes now improved on nitro drip and admitted to the CICU for further monitoring    #Neuro: No active issues, hx of chronic low back pain (s/p epidural injection 4/23)  aaox 4     #Cardiac   Unstable Angina with EKG changes neg CE: likely in the setting of tight occlusion of coronaries arteries  initial troponin negative  asa/brillinta loaded, started on heparin drip  trend Cathryn, serial EKGs  continue nitro drip for symptom relief    #Resp: No active issues   Sat % on room air     # CAD s/p CABG in 1991  most recent cath in 2013: with PATTI to LIMA (aorta to mid LAD)  Jul 2020 nuclear stress test with nonspecific repolarization changes noted, LV normal in size, medium sized, mild defect in basal to mid anterolateral wall that is reversible c/w mild ischemia although improved with prone imaging, nml LV function on post stress gated wall motion analysis LVEF = 63%  most recent ECHO in Aug 2019: mild to mod MR, minimal AR, concentric LV, nml LV sys and diastolic fxn, mild to moderate TR, mild NC, borderline pulm HTN   was due for repeat Cath on Monday  continue asa brillinta statin and BB ace  repeat ECHO    # GI: No active issues   DASH TLC diet    # Renal : Cr stable   - electrolyte abnormalites noted  - keep K>4, and Mg >2    #Mis:  full code   70y M w/ PMHx of CAD s/p CABG(1991) and PATTI to LIMA in 2013, chronic back pain s/p recent epidural injection,  HLD, HTN, seasonal allergies, hearing loss and BPH p/w active CP likely unstable angina with EKG changes now improved on nitro drip and admitted to the CICU for further monitoring    #Neuro: No active issues, hx of chronic low back pain (s/p epidural injection 4/23)  aaox 4     #Cardiac : S/P CABG 1991 transferred for Unstable Angina   CE flat   s/p ASA/Brilinta loaded, started on heparin drip  off Nitro drip since 2am   - Cardiac cath  - TTE   - Cont. ASA, Brilinta and Lipitor     #Resp: No active issues   Sat % on room air     # GI: No active issues   DASH TLC diet    # Renal : Cr stable   - keep K>4, and Mg >2    #Mis:  full code

## 2021-04-25 NOTE — CHART NOTE - NSCHARTNOTEFT_GEN_A_CORE
Removal of Femoral Sheath    Pulses in the (right/left) lower extremity are palpable. The patient was placed in the supine position. The insertion site was identified and the sutures were removed per protocol.  The __7__ D[place]hi femoral sheath was then removed. Direct pressure was applied for  _19_____ minutes.     Monitoring of the (right/left) groin and both lower extremities including neuro-vascular checks and vital signs every 15 minutes x 4, then every 30 minutes x 2, then every 1 hour was ordered.    Complications: None    Comments: sand bag applied

## 2021-04-26 ENCOUNTER — TRANSCRIPTION ENCOUNTER (OUTPATIENT)
Age: 71
End: 2021-04-26

## 2021-04-26 VITALS
DIASTOLIC BLOOD PRESSURE: 82 MMHG | TEMPERATURE: 99 F | HEART RATE: 70 BPM | OXYGEN SATURATION: 98 % | SYSTOLIC BLOOD PRESSURE: 159 MMHG | RESPIRATION RATE: 18 BRPM

## 2021-04-26 DIAGNOSIS — I20.0 UNSTABLE ANGINA: ICD-10-CM

## 2021-04-26 DIAGNOSIS — I10 ESSENTIAL (PRIMARY) HYPERTENSION: ICD-10-CM

## 2021-04-26 DIAGNOSIS — I25.10 ATHEROSCLEROTIC HEART DISEASE OF NATIVE CORONARY ARTERY WITHOUT ANGINA PECTORIS: ICD-10-CM

## 2021-04-26 LAB
ANION GAP SERPL CALC-SCNC: 13 MMOL/L — SIGNIFICANT CHANGE UP (ref 5–17)
BUN SERPL-MCNC: 10 MG/DL — SIGNIFICANT CHANGE UP (ref 7–23)
CALCIUM SERPL-MCNC: 9.2 MG/DL — SIGNIFICANT CHANGE UP (ref 8.4–10.5)
CHLORIDE SERPL-SCNC: 102 MMOL/L — SIGNIFICANT CHANGE UP (ref 96–108)
CO2 SERPL-SCNC: 19 MMOL/L — LOW (ref 22–31)
CREAT SERPL-MCNC: 0.71 MG/DL — SIGNIFICANT CHANGE UP (ref 0.5–1.3)
GLUCOSE SERPL-MCNC: 101 MG/DL — HIGH (ref 70–99)
HCT VFR BLD CALC: 40.5 % — SIGNIFICANT CHANGE UP (ref 39–50)
HGB BLD-MCNC: 13.5 G/DL — SIGNIFICANT CHANGE UP (ref 13–17)
MAGNESIUM SERPL-MCNC: 2 MG/DL — SIGNIFICANT CHANGE UP (ref 1.6–2.6)
MCHC RBC-ENTMCNC: 33.3 GM/DL — SIGNIFICANT CHANGE UP (ref 32–36)
MCHC RBC-ENTMCNC: 34.5 PG — HIGH (ref 27–34)
MCV RBC AUTO: 103.6 FL — HIGH (ref 80–100)
NRBC # BLD: 0 /100 WBCS — SIGNIFICANT CHANGE UP (ref 0–0)
PHOSPHATE SERPL-MCNC: 3.2 MG/DL — SIGNIFICANT CHANGE UP (ref 2.5–4.5)
PLATELET # BLD AUTO: 195 K/UL — SIGNIFICANT CHANGE UP (ref 150–400)
POTASSIUM SERPL-MCNC: 4.3 MMOL/L — SIGNIFICANT CHANGE UP (ref 3.5–5.3)
POTASSIUM SERPL-SCNC: 4.3 MMOL/L — SIGNIFICANT CHANGE UP (ref 3.5–5.3)
RBC # BLD: 3.91 M/UL — LOW (ref 4.2–5.8)
RBC # FLD: 11.8 % — SIGNIFICANT CHANGE UP (ref 10.3–14.5)
SODIUM SERPL-SCNC: 134 MMOL/L — LOW (ref 135–145)
WBC # BLD: 9.37 K/UL — SIGNIFICANT CHANGE UP (ref 3.8–10.5)
WBC # FLD AUTO: 9.37 K/UL — SIGNIFICANT CHANGE UP (ref 3.8–10.5)

## 2021-04-26 PROCEDURE — 99232 SBSQ HOSP IP/OBS MODERATE 35: CPT

## 2021-04-26 PROCEDURE — 85027 COMPLETE CBC AUTOMATED: CPT

## 2021-04-26 PROCEDURE — 86850 RBC ANTIBODY SCREEN: CPT

## 2021-04-26 PROCEDURE — 83735 ASSAY OF MAGNESIUM: CPT

## 2021-04-26 PROCEDURE — 84484 ASSAY OF TROPONIN QUANT: CPT

## 2021-04-26 PROCEDURE — C1894: CPT

## 2021-04-26 PROCEDURE — 85610 PROTHROMBIN TIME: CPT

## 2021-04-26 PROCEDURE — 93306 TTE W/DOPPLER COMPLETE: CPT

## 2021-04-26 PROCEDURE — 71045 X-RAY EXAM CHEST 1 VIEW: CPT

## 2021-04-26 PROCEDURE — 99285 EMERGENCY DEPT VISIT HI MDM: CPT | Mod: 25

## 2021-04-26 PROCEDURE — 82553 CREATINE MB FRACTION: CPT

## 2021-04-26 PROCEDURE — 84100 ASSAY OF PHOSPHORUS: CPT

## 2021-04-26 PROCEDURE — 82550 ASSAY OF CK (CPK): CPT

## 2021-04-26 PROCEDURE — C1887: CPT

## 2021-04-26 PROCEDURE — 83036 HEMOGLOBIN GLYCOSYLATED A1C: CPT

## 2021-04-26 PROCEDURE — 80061 LIPID PANEL: CPT

## 2021-04-26 PROCEDURE — C1884: CPT

## 2021-04-26 PROCEDURE — 93005 ELECTROCARDIOGRAM TRACING: CPT

## 2021-04-26 PROCEDURE — C1769: CPT

## 2021-04-26 PROCEDURE — 80048 BASIC METABOLIC PNL TOTAL CA: CPT

## 2021-04-26 PROCEDURE — 86900 BLOOD TYPING SEROLOGIC ABO: CPT

## 2021-04-26 PROCEDURE — 96374 THER/PROPH/DIAG INJ IV PUSH: CPT

## 2021-04-26 PROCEDURE — C9604: CPT | Mod: RC

## 2021-04-26 PROCEDURE — U0003: CPT

## 2021-04-26 PROCEDURE — 86901 BLOOD TYPING SEROLOGIC RH(D): CPT

## 2021-04-26 PROCEDURE — 85730 THROMBOPLASTIN TIME PARTIAL: CPT

## 2021-04-26 PROCEDURE — 96375 TX/PRO/DX INJ NEW DRUG ADDON: CPT

## 2021-04-26 PROCEDURE — 84443 ASSAY THYROID STIM HORMONE: CPT

## 2021-04-26 PROCEDURE — 86769 SARS-COV-2 COVID-19 ANTIBODY: CPT

## 2021-04-26 PROCEDURE — C1874: CPT

## 2021-04-26 PROCEDURE — 80053 COMPREHEN METABOLIC PANEL: CPT

## 2021-04-26 PROCEDURE — 93459 L HRT ART/GRFT ANGIO: CPT | Mod: 59

## 2021-04-26 PROCEDURE — C1725: CPT

## 2021-04-26 PROCEDURE — 85025 COMPLETE CBC W/AUTO DIFF WBC: CPT

## 2021-04-26 PROCEDURE — 99239 HOSP IP/OBS DSCHRG MGMT >30: CPT

## 2021-04-26 RX ORDER — CLOPIDOGREL BISULFATE 75 MG/1
1 TABLET, FILM COATED ORAL
Qty: 0 | Refills: 0 | DISCHARGE

## 2021-04-26 RX ORDER — METOPROLOL TARTRATE 50 MG
12.5 TABLET ORAL EVERY 12 HOURS
Refills: 0 | Status: DISCONTINUED | OUTPATIENT
Start: 2021-04-26 | End: 2021-04-26

## 2021-04-26 RX ORDER — TICAGRELOR 90 MG/1
1 TABLET ORAL
Qty: 60 | Refills: 0
Start: 2021-04-26 | End: 2021-05-25

## 2021-04-26 RX ORDER — METOPROLOL TARTRATE 50 MG
0.5 TABLET ORAL
Qty: 0 | Refills: 0 | DISCHARGE

## 2021-04-26 RX ADMIN — HEPARIN SODIUM 5000 UNIT(S): 5000 INJECTION INTRAVENOUS; SUBCUTANEOUS at 05:50

## 2021-04-26 RX ADMIN — TICAGRELOR 90 MILLIGRAM(S): 90 TABLET ORAL at 05:49

## 2021-04-26 RX ADMIN — Medication 81 MILLIGRAM(S): at 11:57

## 2021-04-26 RX ADMIN — LOSARTAN POTASSIUM 12.5 MILLIGRAM(S): 100 TABLET, FILM COATED ORAL at 05:49

## 2021-04-26 NOTE — DISCHARGE NOTE PROVIDER - NSDCMRMEDTOKEN_GEN_ALL_CORE_FT
aspirin 81 mg oral delayed release tablet: 1 tab(s) orally once a day  Lipitor 80 mg oral tablet: 1 tab(s) orally once a day  losartan 25 mg oral tablet: 0.5 tab(s) orally 2 times a day  nitroglycerin 0.4 mg sublingual tablet: 1 tab(s) sublingual every 5 minutes, As Needed  Ranexa 1000 mg oral tablet, extended release: 1 tab(s) orally 2 times a day  ticagrelor 90 mg oral tablet: 1 tab(s) orally every 12 hours

## 2021-04-26 NOTE — PROGRESS NOTE ADULT - PROBLEM SELECTOR PLAN 3
c/w ARB    Patient is medically stable for d/c home today, to follow up with cardiology outpt. Time spent 44min     d/w medicine VICKY Vaughan

## 2021-04-26 NOTE — DISCHARGE NOTE PROVIDER - NSDCCPCAREPLAN_GEN_ALL_CORE_FT
PRINCIPAL DISCHARGE DIAGNOSIS  Diagnosis: Stented coronary artery  Assessment and Plan of Treatment: Angioplasty or coronary stenting are procedures to open up narrowed or blocked coronary arteries in the heart.  A stent is a tiny metal tube that helps to prop open an artery in the heart muscle.    Your doctor will instruct you when you can drive or resume usual physical activities  You MUST take aspirin &, Brilinta to help prevent clots inside the stent.  It is VERY important to take these medications as directed unless your cardiologist says it is OK to stop.  If another physican advises you to stop them, call cardiologist to discuss this advise since there is a risk of a heart attack or even death stopping these medications earlier than they should be.  Do NOT take more than 81 mg aspirin with Brilinta  The most common problems after coronary stenting is bleeding, bruising, & soreness at the tube insertion site - you can use tylenol for discomfort if not contraindicated  Call your doctor if you have chest pain, fever, pain, swelling, or redness where the tube went in        SECONDARY DISCHARGE DIAGNOSES  Diagnosis: CAD (coronary artery disease)  Assessment and Plan of Treatment: CAD (coronary artery disease) Coronary artery disease is a condition where the arteries the supply the heart muscle get clogged with fatty deposits & puts you at risk for a heart attack  Call your doctor if you have any new pain, pressure, or discomfort in the center of your chest, pain, tingling or discomfort in arms, back, neck, jaw, or stomach, shortness of breath, nausea, vomiting, burping or heartburn, sweating, cold and clammy skin, racing or abnormal heartbeat for more than 10 minutes or if they keep coming & going.  Call 911 and do not tr to get to hospital by care  You can help yourself with lefestyle changes (quitting smoking if you smoke), eat lots of fruits & vegetables & low fat dairy products, not a lot of meat & fatty foods, walk or some form of physical activity most days of the week, lose weight if you are overweight  Take your cardiac medication as prescribed to lower cholesterol, to lower blood pressure, aspirin to prevent blood clots, and diabetes control  Make sure to keep appointments with doctor for cardiac follow up care      Diagnosis: Unstable angina  Assessment and Plan of Treatment: Unstable angina

## 2021-04-26 NOTE — DISCHARGE NOTE NURSING/CASE MANAGEMENT/SOCIAL WORK - PATIENT PORTAL LINK FT
You can access the FollowMyHealth Patient Portal offered by [facility] by registering at the following website: [email or web address]. By joining [facility]’s FollowMyHealth portal, you will also be able to view your health information using other applications (apps) compatible with our system.

## 2021-04-26 NOTE — PROGRESS NOTE ADULT - SUBJECTIVE AND OBJECTIVE BOX
CICU DAY NOTE  Admission date: 4/24/21  Chief complaint/ Diagnosis: CHB  HPI:70y M w/ PMHx of CAD s/p CABG(1991) and POBA in 2012, HLD, HTN, seasonal allergies, hearing loss, chronic low back pain and BPH former smoker p/f active chest pain originally exertional in nature for the last few weeks, however starting yest evening having left sided pressure like non radiating pain at rest with dizziness w/o SOB or cough. At home had taken sublingual nitro x4 with some relief. Denies PND, orthopnea, pleuritic chest pain, hx of blood clots. Pt was scheduled for angio w/ Cardiologist Dr. Saldaña on Monday.  Of note patient suffers from chronic low back pain not amenable to medications so underwent epidural injection on 4/23 at [facility] with a pain specialist. He had not been taking his plavix from the 16th and had restarted it on the 24th.   In the ED afebrile, HR in 50s-60s, BP 120s-160s, RR 15-17s satting 100% on RA, leukocytosis of 11.75, Hgb 12.7, .1, Na 132, bicarb 21, trop 17, clear lungs on CXR, EKG with changes from prior  given: heparin ggt with bolus, asa 324, brilinta 180, nitro drip, atorva 80,  in 10 Jul 2020 had nuclear stress test with nonspecific repolarization changes noted, LV normal in size, medium sized, mild defect in basal to mid anterolateral wall that is reversible c/w mild ischemia although improved with prone imaging, nml LV function on post stress gated wall motion analysis LVEF = 63%  most recent ECHO in Aug 2019: mild to mod MR, minimal AR, concentric LV, nml LV sys and diastolic fxn, mild to moderate TR, mild NV, borderline pulm HTN   home medications include confirmed Aspirin 81, Pnnzwbhkqatr12 MG, Clopidogrel 75 MG, losartan 12.5 BID, Metoprolol Succinate ER 12.5 MG, Ranolazine ER 1000 MG BID, Nitroglycerin 0.4 MG Sublingual  (24 Apr 2021 23:56)    Interval history: Uneventful overnight  Trop 19<17, CK 96<139  Pt remains CP free and hemodynamically stable     REVIEW OF SYSTEMS  Denies CP, Palpitation, SOB, Dyspnea [X  ] All other systems negative    MEDICATIONS  (STANDING)  aspirin enteric coated 81 milliGRAM(s) Oral daily  atorvastatin 80 milliGRAM(s) Oral at bedtime  chlorhexidine 4% Liquid 1 Application(s) Topical <User Schedule>  heparin  Infusion 700 Unit(s)/Hr (5.5 mL/Hr) IV Continuous <Continuous>  ticagrelor 90 milliGRAM(s) Oral every 12 hours    PAST MEDICAL & SURGICAL HISTORY:  CAD (coronary artery disease)  Hypercholesteremia  HTN (hypertension)  Former cigarette smoker    Allergy: No Known Allergies    ICU Vital Signs Last 24 Hrs  T(C): 36.6 (Max: 36.8)  HR: 54  (50 - 68)  BP: 123/56 (90/44 - 156/76)  RR: 14 (12 - 25)  SpO2: 100% (98% - 100%) on room air     I&O's Summary  IN: 488.5 mL / OUT: 1475 mL / NET: -986.5 mL    PHYSICAL EXAM  Appearance: Normal, NAD  HEAD:  Normocephalic  EYES: PERRLA, conjunctiva and sclera clear  NECK: Supple, No JVD  CHEST/LUNG: Clear to auscultation bilaterally; No wheezing  HEART: Normal S1, S2. No murmurs, rubs, or gallops  ABDOMEN: + Bowel sounds, Soft, NT, ND   EXTREMITIES:  2+ Peripheral Pulses, No clubbing, cyanosis, or edema  NEUROLOGY: non-focal, aaox3  SKIN: No rashes or lesions    Interpretation of Telemetry: NSR                                 12.2<12.7   11.13<11.75 )-----------( 189                             35.9     135  |  103  |  11  ----------------------------<  122<H>  4.2   |  21<L>  |  0.62    Ca    8.7      Phos  2.2     Mg     2.1      TPro  6.3  /  Alb  3.9  /  TBili  0.4  /  DBili  x   /  AST  18  /  ALT  20  /  AlkPhos  51             
Dr. Nazia Piper   Division of Hospital Medicine  [facility]   Pager: [phone]     Patient is a 70y old  Male who presents with a chief complaint of unstable angina (26 Apr 2021 11:16)      SUBJECTIVE / OVERNIGHT EVENTS: Patient seen and examined at bedside. States that he feels ok, denies any CP, SOB, abd pain and n/v. No acute events overnight.     ROS:  All other review of systems negative    Allergies    No Known Allergies    Intolerances        MEDICATIONS  (STANDING):  aspirin enteric coated 81 milliGRAM(s) Oral daily  atorvastatin 80 milliGRAM(s) Oral at bedtime  heparin   Injectable 5000 Unit(s) SubCutaneous every 8 hours  losartan 12.5 milliGRAM(s) Oral daily  metoprolol tartrate 12.5 milliGRAM(s) Oral every 12 hours  ticagrelor 90 milliGRAM(s) Oral every 12 hours    MEDICATIONS  (PRN):      Vital Signs Last 24 Hrs  T(C): 36.6 (26 Apr 2021 05:08), Max: 36.9 (25 Apr 2021 19:00)  T(F): 97.8 (26 Apr 2021 05:08), Max: 98.5 (25 Apr 2021 19:00)  HR: 54 (26 Apr 2021 05:08) (51 - 66)  BP: 123/76 (26 Apr 2021 05:08) (109/63 - 152/62)  BP(mean): 85 (25 Apr 2021 22:00) (75 - 98)  RR: 18 (26 Apr 2021 05:08) (10 - 18)  SpO2: 100% (26 Apr 2021 05:08) (97% - 100%)  CAPILLARY BLOOD GLUCOSE        I&O's Summary    25 Apr 2021 07:01  -  26 Apr 2021 07:00  --------------------------------------------------------  IN: 422.5 mL / OUT: 1325 mL / NET: -902.5 mL    26 Apr 2021 07:01  -  26 Apr 2021 12:26  --------------------------------------------------------  IN: 240 mL / OUT: 0 mL / NET: 240 mL        PHYSICAL EXAM:  GENERAL: NAD, well-developed  HEAD:  Atraumatic, Normocephalic  EYES: EOMI, PERRLA, conjunctiva and sclera clear  NECK: Supple, No JVD  CHEST/LUNG: Clear to auscultation bilaterally; No wheeze  HEART: Regular rate and rhythm; No murmurs, rubs, or gallops  ABDOMEN: Soft, Nontender, Nondistended; Bowel sounds present  EXTREMITIES:  2+ Peripheral Pulses, No clubbing, cyanosis, or edema  NEUROLOGY: AAOx3, non-focal  PSYCH: calm  SKIN: R groin site wnl,    LABS:                        13.5   9.37  )-----------( 195      ( 26 Apr 2021 06:06 )             40.5     04-26    134<L>  |  102  |  10  ----------------------------<  101<H>  4.3   |  19<L>  |  0.71    Ca    9.2      26 Apr 2021 06:03  Phos  3.2     04-26  Mg     2.0     04-26    TPro  6.3  /  Alb  3.9  /  TBili  0.6  /  DBili  x   /  AST  18  /  ALT  21  /  AlkPhos  46  04-25    PT/INR - ( 24 Apr 2021 21:43 )   PT: 12.7 sec;   INR: 1.06 ratio         PTT - ( 25 Apr 2021 03:20 )  PTT:131.6 sec  CARDIAC MARKERS ( 25 Apr 2021 12:16 )  x     / x     / 75 U/L / x     / 3.0 ng/mL  CARDIAC MARKERS ( 25 Apr 2021 03:20 )  x     / x     / 96 U/L / x     / 3.7 ng/mL  CARDIAC MARKERS ( 24 Apr 2021 21:43 )  x     / x     / 139 U/L / x     / 4.2 ng/mL          RADIOLOGY & ADDITIONAL TESTS:    Consultant(s) Notes Reviewed:  Cardiology rec noted    Care Discussed with Consultants/Other Providers: Medicine NP 
Patient seen and examined at bedside.    Overnight Events: Patient no longer has chest pain. No SOB. No R leg swelling or pain.     Current Meds:  aspirin enteric coated 81 milliGRAM(s) Oral daily  atorvastatin 80 milliGRAM(s) Oral at bedtime  heparin   Injectable 5000 Unit(s) SubCutaneous every 8 hours  losartan 12.5 milliGRAM(s) Oral daily  metoprolol tartrate 12.5 milliGRAM(s) Oral every 12 hours  ticagrelor 90 milliGRAM(s) Oral every 12 hours      Vitals:  T(F): 97.8 (04-26), Max: 98.5 (04-25)  HR: 54 (04-26) (51 - 66)  BP: 123/76 (04-26) (109/63 - 152/62)  RR: 18 (04-26)  SpO2: 100% (04-26)  I&O's Summary    25 Apr 2021 07:01  -  26 Apr 2021 07:00  --------------------------------------------------------  IN: 422.5 mL / OUT: 1325 mL / NET: -902.5 mL    26 Apr 2021 07:01  -  26 Apr 2021 11:17  --------------------------------------------------------  IN: 240 mL / OUT: 0 mL / NET: 240 mL        Physical Exam:  Appearance: No acute distress; well appearing  Eyes:  EOMI, pink conjunctiva  HENT: Normal oral mucosa  Cardiovascular: RRR, S1, S2, no murmurs, rubs, or gallops; no edema; no JVD. R groin without hematoma. 2+ DP pulses bilaterally.   Respiratory: Clear to auscultation bilaterally  Gastrointestinal: soft, non-tender, non-distended with normal bowel sounds  Musculoskeletal: No clubbing; no joint deformity   Neurologic: Non-focal  Lymphatic: No lymphadenopathy  Psychiatry: AAOx3, mood & affect appropriate  Skin: No rashes                          13.5   9.37  )-----------( 195      ( 26 Apr 2021 06:06 )             40.5     04-26    134<L>  |  102  |  10  ----------------------------<  101<H>  4.3   |  19<L>  |  0.71    Ca    9.2      26 Apr 2021 06:03  Phos  3.2     04-26  Mg     2.0     04-26    TPro  6.3  /  Alb  3.9  /  TBili  0.6  /  DBili  x   /  AST  18  /  ALT  21  /  AlkPhos  46  04-25    PT/INR - ( 24 Apr 2021 21:43 )   PT: 12.7 sec;   INR: 1.06 ratio      PTT - ( 25 Apr 2021 03:20 )  PTT:131.6 sec  CARDIAC MARKERS ( 25 Apr 2021 12:16 )  28 ng/L / x     / x     / 75 U/L / x     / 3.0 ng/mL  CARDIAC MARKERS ( 25 Apr 2021 03:20 )  19 ng/L / x     / x     / 96 U/L / x     / 3.7 ng/mL  CARDIAC MARKERS ( 24 Apr 2021 21:43 )  17 ng/L / x     / x     / 139 U/L / x     / 4.2 ng/mL    New ECG(s): Personally reviewed    Echo:  < from: Transthoracic Echocardiogram (04.25.21 @ 09:43) >  Conclusions:  1. Normal mitral valve. Mild-moderate mitral regurgitation.  2. Calcified trileaflet aortic valve with normal opening.  Minimal aortic regurgitation.  3. Normal left ventricular systolic function. No segmental  wall motion abnormalities.  4. Indeterminate diastolic function.  5. Normal right ventricular size and function.  *** No previous Echo exam.  ------------------------------------------------------------------------  Confirmed on  4/25/2021 - 11:23:11 by Betito Baca M.D.  ------------------------------------------------------------------------    < end of copied text >      Stress Testing:     Cath:  < from: Cardiac Cath Lab - Adult (04.25.21 @ 08:38) >  diaphragmatic hypokinesis. Global left ventricular function was mildly  depressed. EF estimated was 45 %.  CORONARY VESSELS: The coronary circulation is right dominant.  LM:   --  LM: There was a tubular 80 % stenosis. There was BRYCE grade 3  flow through the vessel (brisk flow).  LAD:   --  Ostial LAD: There was a 100 % stenosis. There was TIMIgrade 0  flow through the vessel (no flow).  --  D1: The vessel was small sized.  CX:   --  Circumflex: Angiography showed minor luminal irregularities with  no flow limiting lesions.  RI:   --  Proximal ramus intermedius: The vessel was small sized.There was  a tubular 70 % stenosis in the proximal third of the vessel segment. There  was BRYCE grade 3 flow through the vessel (brisk flow).  RCA:   --  RCA: This vessel was not injected.  GRAFTS:   --  Graft to the mid LAD: The graft was a LIMA. There was a  discrete 40 % stenosis at the distal anastomosis, at the site of a prior  stent.  --  Graft to the 1st diagonal: The graft was a saphenous vein graft from  the aorta. Graft angiography showed minor luminal irregularities and  excessive ectasia. There was a discrete 40 % stenosis at the distal  anastomosis.  --  Graft to the RPDA: The graft was a saphenous vein graft from the aorta.  There was a diffuse 40 % stenosis in the middle third of the graft. There  was a discrete 95 % stenosis in the distal third of the graft. There was  BRYCE grade 3 flow through the graft (brisk flow).  COMPLICATIONS: There were no complications.  SUMMARY:  1ST LESION INTERVENTIONS: A successful balloon angioplasty with stent was  performed on the 95 % lesion in the distal third of the saphenous vein  graft from the aorta to the right posterior descending. Following  intervention there was a 1 % residual stenosis.  DIAGNOSTIC RECOMMENDATIONS: PCI of distal SVG to RPDA for treatment of  unstable angina.  INTERVENTIONAL RECOMMENDATIONS: Continue aspirin, 81 mg, PO, daily. Add  ticagrelor 90 mg twice daily. Patient management should include aggressive  medical therapy.  Prepared and signed by  Reginaldo Villatoro M.D.  Signed 04/25/2021 12:08:12    < end of copied text >      Imaging:

## 2021-04-26 NOTE — DISCHARGE NOTE PROVIDER - CARE PROVIDERS DIRECT ADDRESSES
[email or web address]

## 2021-04-26 NOTE — DISCHARGE NOTE PROVIDER - NSDCFUADDINST_GEN_ALL_CORE_FT
As per your discussion with the cardiology team, you are being discharged on "Brilinta" (generic is Ticagrelor ) instead of Plavix. Brilinta may be more expensive than Plavix -> take it for 1 month, and if financially unfeasible, discuss with Dr. Saldaña (cardiologist) about switching back to Plavix.  Do not take you home medication Toprol (Metoprolol) until you follow up with your cardiologist -> it was stopped here in the hospital because your heart rate was low.  Make appointment(s) to follow up with your physician(s) 1 - 2 weeks after discharge.  Bring all discharge paperwork including discharge medication list to follow up appointment.

## 2021-04-26 NOTE — DISCHARGE NOTE PROVIDER - CARE PROVIDER_API CALL
Eric Saldaña)  Cardiovascular Disease; Internal Medicine  [address] [place] [address]  Phone: [phone]  Fax: [phone]  Follow Up Time:     Tj Chino  INTERNAL MEDICINE  [address] [facility], [place] [address]  Phone: [phone]  Fax: [phone]  Follow Up Time:

## 2021-04-26 NOTE — DISCHARGE NOTE PROVIDER - HOSPITAL COURSE
70y M w/ PMHx of CAD s/p CABG(1991) and POBA in 2012, HLD, HTN, seasonal allergies, hearing loss, chronic low back pain and BPH former smoker   Presented with active chest pain.   Of note, recently OFF PLAVIX (4/16/21 - 4/24/21) for epidural injection for low back pain  Patient initially in the CICU for refractory chest pain requiring NTG gtt.   Patient s/p PATTI to SVG-RPDA graft 4/25. R fem without any complications.  - continue ASA, brilinta (patient counseled on medication compliance - he is aware his brilinta may be more expensive than plavix, but will take it for 1 month, and if financially unfeasible, will discuss with Dr. Saldaña his cardiologist about switching to plavix)  - continue Lipitor 80  - continue losartan 12.5  - patient unable to be started on beta blocker due to bradycardia in the 50s  - continue ranolazine 1000 BID  - patient has SL NTG PRN    Patient is ok from cardiology standpoint for discharge. He is a patient of Dr. Saldaña (Cardiology) 70y M w/ PMHx of CAD s/p CABG(1991) and POBA in 2012, HLD, HTN, seasonal allergies, hearing loss, chronic low back pain and BPH former smoker Presented with active chest pain. Patient requiring CICU admission for refractory chest pain requiring NTG gtt. He is s/p PATTI to SVG-RPDA graft 4/25. R fem without any complications. rec to continue ASA, brilinta (patient counseled on medication compliance - he is aware his brilinta may be more expensive than plavix, but will take it for 1 month, and if financially unfeasible, will discuss with Dr. Saldaña his cardiologist about switching to plavix). Patient unable to be started on beta blocker due to bradycardia in the 50s  Patient is medically stable for d/c home today, to follow up with Dr. Saldaña (Cardiology) outpt.     Dr. Nazia Piper   Division of Hospital Medicine  [facility]   Pager: [phone]

## 2021-04-26 NOTE — PROGRESS NOTE ADULT - ASSESSMENT
70y M w/ PMHx of CAD s/p CABG(1991) and POBA in 2012, HLD, HTN, seasonal allergies, hearing loss, chronic low back pain and BPH former smoker p/f active chest pain. Patient initially in the CICU for refractory chest pain requiring NTG gtt. Now s/p PATTI to SVG-RPDA.     CAD / UA s/p CABGF 1991, POBA 2012: Patient now s/p PATTI to SVG-RPDA graft 4/25. R fem without any complications.  - continue ASA, brilinta (patient counseled on medication compliance - he is aware his brilinta may be more expensive than plavix, but will take it for 1 month, and if financially unfeasible, will discuss with Dr. Saldaña his cardiologist about switching to plavix)  - continue Lipitor 80  - continue losartan 12.5  - patient unable to be started on beta blocker due to bradycardia in the 50s  - continue ranolazine 1000 BID  - patient has SL NTG PRN    Patient is ok from cardiology standpoint for discharge. He is a patient of Dr. Saldaña (Cardiology). Please arrange for follow up in 1 week.    Delilah Rhoades MD  Cardiology Fellow, PGY-4  [phone]  For all other Cardiology service contact information, go to [email or web address] and use "[email or web address]" to login.

## 2021-04-26 NOTE — PROGRESS NOTE ADULT - ASSESSMENT
70y M w/ PMHx of CAD s/p CABG(1991) and PATTI to LIMA in 2013, chronic back pain s/p recent epidural injection,  HLD, HTN, seasonal allergies, hearing loss and BPH p/w active CP likely unstable angina with EKG changes s/p nitro drip requiring CICU admission, Now s/p PATTI to SVG-RPDA.

## 2021-04-26 NOTE — PROGRESS NOTE ADULT - ATTENDING COMMENTS
NSTEMI, preserved LVEF. PCI to vein graft. Asymptomatic; home today with f/u by primary cardiologist. DAPT.
Patient is seen and examined with fellow, NP and the CCU house-staff. I agree with the history, physical and the assessment and plan.  p/w unstable angina with high risk ECG  trned CE  on hep gtt  awaiting cardiac cath  f/u with TTE  on DAPT

## 2021-04-26 NOTE — DISCHARGE NOTE PROVIDER - NSDCCPTREATMENT_GEN_ALL_CORE_FT
PRINCIPAL PROCEDURE  Procedure: Angiogram, coronary, in cardiac catheterization laboratory  Findings and Treatment: 4/25/21:  SUMMARY:  1ST LESION INTERVENTIONS: A successful balloon angioplasty with stent wasperformed on the 95 % lesion in the distal third of the saphenous vein graft from the aorta to the right posterior descending. Following intervention there was a 1 % residual stenosis.  DIAGNOSTIC RECOMMENDATIONS: PCI of distal SVG to RPDA for treatment of unstable angina.  INTERVENTIONAL RECOMMENDATIONS: Continue aspirin, 81 mg, PO, daily. Add ticagrelor 90 mg twice daily. Patient management should include aggressive medical therapy.  Prepared and signed by  Reginaldo Villatoro M.D.

## 2021-04-26 NOTE — PROGRESS NOTE ADULT - PROBLEM SELECTOR PLAN 1
s/p PATTI to SVG-RPDA.  - continue ASA, brilinta   - continue Lipitor 80  - continue losartan 12.5  - patient unable to be started on beta blocker due to bradycardia  - continue ranolazine 1000 BID  - f/u with cardiology outpt

## 2021-04-27 ENCOUNTER — NON-APPOINTMENT (OUTPATIENT)
Age: 71
End: 2021-04-27

## 2021-05-04 ENCOUNTER — NON-APPOINTMENT (OUTPATIENT)
Age: 71
End: 2021-05-04

## 2021-05-04 ENCOUNTER — APPOINTMENT (OUTPATIENT)
Dept: CARDIOLOGY | Facility: CLINIC | Age: 71
End: 2021-05-04
Payer: MEDICARE

## 2021-05-04 VITALS
SYSTOLIC BLOOD PRESSURE: 149 MMHG | WEIGHT: 118 LBS | HEIGHT: 62 IN | OXYGEN SATURATION: 99 % | HEART RATE: 63 BPM | DIASTOLIC BLOOD PRESSURE: 75 MMHG | BODY MASS INDEX: 21.71 KG/M2

## 2021-05-04 DIAGNOSIS — I65.29 OCCLUSION AND STENOSIS OF UNSPECIFIED CAROTID ARTERY: ICD-10-CM

## 2021-05-04 PROCEDURE — 99072 ADDL SUPL MATRL&STAF TM PHE: CPT

## 2021-05-04 PROCEDURE — 99215 OFFICE O/P EST HI 40 MIN: CPT

## 2021-05-04 NOTE — REVIEW OF SYSTEMS
[Joint Pain] : no joint pain [Rash] : no rash [Dizziness] : no dizziness [Depression] : no depression [Anxiety] : no anxiety [Easy Bleeding] : no tendency for easy bleeding [Easy Bruising] : no tendency for easy bruising [Negative] : Genitourinary

## 2021-05-04 NOTE — DISCUSSION/SUMMARY
[FreeTextEntry1] : Patient is feeling better without any angina.  He is going to reduce his Ranexa to 500 mg a day.  He will increase his losartan to 25 mg twice a day and see how his blood pressure is.  If he has any problems he would call me.  I will speak with Dr. Villatoro about the Brilinta.  It is expensive and the patient is not wanting to take it chronically.\par \par Went over his hospitalization including his echo, and his cardiac catheterization I answered his questions.  I would see him in 1 month.

## 2021-05-04 NOTE — REASON FOR VISIT
[Follow-Up - Clinic] : a clinic follow-up of [Angina Pectoris] : angina pectoris [Coronary Artery Disease] : coronary artery disease [CABG Follow-up] : bypass graft [Hyperlipidemia] : hyperlipidemia [Home] : at home, [unfilled] , at the time of the visit. [Medical Office: ([facility])___] : at the medical office located in  [Verbal consent obtained from patient] : the patient, [unfilled] [FreeTextEntry1] : Recent stent placed to LIMA-LAD anastomosis

## 2021-05-04 NOTE — HISTORY OF PRESENT ILLNESS
[FreeTextEntry1] : I saw Chano Monge in the office today for a follow up visit via telehealth. He is a 69-year-old male who is status post coronary bypass graft surgery in 1991 with a LIMA to the LAD, and vein graft to diagonal, OM, and RCA. In 2006 he underwent repeat cardiac catheterization. This showed that the internal mammary graft was closed . There was retrograde filling from a patent diagonal bypass graft to the LAD. This was compromised by an area of 50-70% stenosis. [facility] did not feel that this was amenable to angioplasty. Did go for a second opinion and to [facility] and so Dr. Leavitt who felt that he might attempt this. Since the patient's symptoms were stable we elected not to do this.\par \par The patient has had stable exertional angina on medication. On his  visit, 10/12,  the patient  noted that the angina was coming on more easily and more frequently. They were reminiscent of the symptoms he had prior to his bypass surgery 21 years ago. Also has associated shortness of breath\par \par .We did a regular stress test in November of 2011 with the patient exercised to 12.8 METs. He did have some discomfort with ECG evidence positive for ischemia. His last nuclear stress test in October of 2010  demonstrated chest discomfort with the thallium portion showing normal perfusion.  \par \par  He did have a carotid Doppler 7/14 that showed mild nonobstructing plaque bilaterally. A repeat study performed 5/17 showed no mild to moderate nonobstructive plaque.\par \par An ECG during the October 2012 visit showed new T-wave inversions in the anterior leads. Patient was sent to [facility] for cardiac catheterization which was performed by Dr. Streeter. This showed that his LIMA graft was patent but had a 95% stenosis at the site of anastomosis to the LAD. All 3 native vessels were closed. The vein graft to the circumflex artery is closed, with a 40% lesion in the RCA graft. The patient underwent successful balloon angioplasty of the anastomotic site between the LIMA graft and LAD. The patient's angina had completely resolved since that procedure. A followup electrocardiogram performed in your office demonstrated that the T wave inversions in the anterior leads have resolved. The patient discontinued his Ranexa.\par \par 8/13  the patient had recurrent symptoms of angina. He underwent repeat cardiac catheterization. This showed 90% left main stenosis, and 100% stenosis of all native blood vessels. There was a 90% stenosis at the anastomosis between the LIMA graft and the LAD, 50% stenosis of the vein graft going to the PDA, and a normal vein graft to D1. A stent was placed to the anastomosis between LIMA and LAD.\par \par The patient continued to have stable exertional angina. Also very rarely gets angina at night when he wakes up from bed dreaming. He had a repeat nuclear stress test 10/17. The treadmill showed ST segment depressions with positive chest pain 9 and 1/2 minutes into exercise. Then converted to a chemical tests. Scans were normal.\par \par Most recent chemical nuclear stress test performed 4/19 demonstrated a small, mild to moderate anterolateral wall reversible defect consistent with ischemia. EF 68% Echo performed 8/19 demonstrated an ejection fraction of 69%. There is mild to moderate MR and TR with PA pressure of 37. Minimal AI. Mild LVH\par \par Recently the patient developed increasing angina.  He underwent cardiac catheterization 4/21 which demonstrated a new 5% gnosis in the distal vein graft to the RPDA.  This was stented.  Patient is feeling better.  He has noted that his blood pressure also has been going up.\par \par Blood work 4/21 demonstrated normal CBC and chemistries.  Strahl is 113, triglycerides 37, HDL 56, and LDL 49.\par \par

## 2021-06-08 ENCOUNTER — APPOINTMENT (OUTPATIENT)
Dept: CARDIOLOGY | Facility: CLINIC | Age: 71
End: 2021-06-08
Payer: MEDICARE

## 2021-06-08 VITALS
SYSTOLIC BLOOD PRESSURE: 125 MMHG | DIASTOLIC BLOOD PRESSURE: 77 MMHG | WEIGHT: 118 LBS | HEART RATE: 60 BPM | HEIGHT: 62 IN | BODY MASS INDEX: 21.71 KG/M2 | OXYGEN SATURATION: 99 %

## 2021-06-08 PROCEDURE — 99214 OFFICE O/P EST MOD 30 MIN: CPT

## 2021-06-08 NOTE — REVIEW OF SYSTEMS
[Negative] : Gastrointestinal [Joint Pain] : no joint pain [Rash] : no rash [Dizziness] : no dizziness [Depression] : no depression [Anxiety] : no anxiety [Easy Bleeding] : no tendency for easy bleeding [Easy Bruising] : no tendency for easy bruising

## 2021-06-08 NOTE — PHYSICAL EXAM
[General Appearance - Well Developed] : well developed [Normal Appearance] : normal appearance [Well Groomed] : well groomed [General Appearance - Well Nourished] : well nourished [No Deformities] : no deformities [General Appearance - In No Acute Distress] : no acute distress [Normal Conjunctiva] : the conjunctiva exhibited no abnormalities [Normal Oral Mucosa] : normal oral mucosa [Normal Jugular Venous A Waves Present] : normal jugular venous A waves present [No Jugular Venous Dominguez A Waves] : no jugular venous dominguez A waves [Normal Jugular Venous V Waves Present] : normal jugular venous V waves present [Bowel Sounds] : normal bowel sounds [Abdomen Soft] : soft [Abdomen Tenderness] : non-tender [Abnormal Walk] : normal gait [Gait - Sufficient For Exercise Testing] : the gait was sufficient for exercise testing [Nail Clubbing] : no clubbing of the fingernails [Cyanosis, Localized] : no localized cyanosis [Skin Color & Pigmentation] : normal skin color and pigmentation [Skin Turgor] : normal skin turgor [Oriented To Time, Place, And Person] : oriented to person, place, and time [Impaired Insight] : insight and judgment were intact [No Anxiety] : not feeling anxious [] : no respiratory distress [Respiration, Rhythm And Depth] : normal respiratory rhythm and effort [Exaggerated Use Of Accessory Muscles For Inspiration] : no accessory muscle use [Auscultation Breath Sounds / Voice Sounds] : lungs were clear to auscultation bilaterally [Normal Rate] : normal [Normal S1] : normal S1 [Normal S2] : normal S2 [No Murmur] : no murmurs heard [2+] : left 2+ [No Abnormalities] : the abdominal aorta was not enlarged and no bruit was heard [No Pitting Edema] : no pitting edema present [S3] : no S3 [S4] : no S4 [Right Carotid Bruit] : no bruit heard over the right carotid [Left Carotid Bruit] : no bruit heard over the left carotid [Right Femoral Bruit] : no bruit heard over the right femoral artery [Left Femoral Bruit] : no bruit heard over the left femoral artery

## 2021-06-08 NOTE — HISTORY OF PRESENT ILLNESS
[FreeTextEntry1] : I saw Chano Monge in the office today for routine follow-up visit.  He is a 70-year-old male who is status post coronary bypass graft surgery in 1991 with a LIMA to the LAD, and vein graft to diagonal, OM, and RCA. In 2006 he underwent repeat cardiac catheterization. This showed that the internal mammary graft was closed . There was retrograde filling from a patent diagonal bypass graft to the LAD. This was compromised by an area of 50-70% stenosis. [facility] did not feel that this was amenable to angioplasty. Did go for a second opinion  to [facility] and saw Dr. Leavitt who felt that he might attempt this. Since the patient's symptoms were stable we elected not to do this.\par \par  The patient has had stable exertional angina on medication. On his  visit, 10/12,  the patient  noted that the angina was coming on more easily and more frequently. They were reminiscent of the symptoms he had prior to his bypass surgery 21 years ago. Also has associated shortness of breath. ECG  showed new T-wave inversions in the anterior leads. Patient was sent to [facility] for cardiac catheterization which was performed by Dr. Streeter. This showed that his LIMA graft was patent but had a 95% stenosis at the site of anastomosis to the LAD. All 3 native vessels were closed. The vein graft to the circumflex artery was closed, with a 40% lesion in the RCA graft. The patient underwent successful balloon angioplasty of the anastomotic site between the LIMA graft and LAD. The patient's angina had completely resolved since that procedure. A followup electrocardiogram performed in your office demonstrated that the T wave inversions in the anterior leads have resolved. The patient discontinued his Ranexa.\par \par 8/13  the patient had recurrent symptoms of angina. He underwent repeat cardiac catheterization. This showed 90% left main stenosis, and 100% stenosis of all native blood vessels. There was a 90% stenosis at the anastomosis between the LIMA graft and the LAD, 50% stenosis of the vein graft going to the PDA, and a normal vein graft to D1. A stent was placed to the anastomosis between LIMA and LAD.\par \par The patient continued to have stable exertional angina.\par \par Chemical nuclear stress test performed 7/20 showed mild anterolateral wall ischemia.  EF was 63%.\par \par Recently the patient developed increasing angina.  He underwent cardiac catheterization 4/21 which demonstrated a new 95% stenosis in the distal vein graft to the RPDA.  This was stented.  Patient is feeling better.  He has noted that his blood pressure also has been going up.\par \par Blood work 4/21 demonstrated normal CBC and chemistries.  Cholesterol is 113, triglycerides 37, HDL 56, and LDL 49.  Last visit I started him on losartan for hypertension.  I spoke with Dr. Villatoro.  We will continue the Brilinta for 3 months and then load with Plavix 600 mg and start Plavix 75 mg once a day.  The patient decided he will stay on the Brilinta.\par \par The patient does have chronic back pain wants an epidural injection several months ago.  The back is still not good and he will require more injections but will try to wait 6 months from when he had the last stent.  Staying on the Ranexa is 1000 mg twice a day.  He feels better and has no angina.\par \par

## 2021-06-08 NOTE — DISCUSSION/SUMMARY
[FreeTextEntry1] : Patient's cardiac status is stable.  He has is exercising regularly and having no chest pain or shortness of breath.  Blood pressure and cholesterol are well controlled.  \par \par He will stay on his present medication.  He will come back for carotid Doppler that was last done 4 years ago.  If he does have any problems he would call me.  If all is well I will see him in 3 months.  If he does need the epidural he will call me right away.  I did go over his medication and I answered all of his questions.

## 2021-06-15 ENCOUNTER — RX RENEWAL (OUTPATIENT)
Age: 71
End: 2021-06-15

## 2021-12-22 ENCOUNTER — NON-APPOINTMENT (OUTPATIENT)
Age: 71
End: 2021-12-22

## 2022-01-21 ENCOUNTER — RX RENEWAL (OUTPATIENT)
Age: 72
End: 2022-01-21

## 2022-03-22 ENCOUNTER — RX RENEWAL (OUTPATIENT)
Age: 72
End: 2022-03-22

## 2022-03-31 ENCOUNTER — APPOINTMENT (OUTPATIENT)
Dept: CARDIOLOGY | Facility: CLINIC | Age: 72
End: 2022-03-31
Payer: MEDICARE

## 2022-03-31 VITALS
HEIGHT: 62 IN | OXYGEN SATURATION: 99 % | SYSTOLIC BLOOD PRESSURE: 145 MMHG | BODY MASS INDEX: 20.98 KG/M2 | WEIGHT: 114 LBS | DIASTOLIC BLOOD PRESSURE: 85 MMHG | HEART RATE: 58 BPM

## 2022-03-31 PROCEDURE — 99214 OFFICE O/P EST MOD 30 MIN: CPT

## 2022-03-31 RX ORDER — GABAPENTIN 300 MG/1
300 CAPSULE ORAL AT BEDTIME
Refills: 0 | Status: ACTIVE | COMMUNITY

## 2022-03-31 NOTE — REVIEW OF SYSTEMS
[Negative] : Genitourinary [Joint Pain] : joint pain [Lower Back Pain] : lower back pain [Rash] : no rash [Dizziness] : no dizziness [Depression] : no depression [Anxiety] : no anxiety [Easy Bleeding] : no tendency for easy bleeding [Easy Bruising] : no tendency for easy bruising

## 2022-03-31 NOTE — DISCUSSION/SUMMARY
[FreeTextEntry1] : Clinically the patient is doing well.  He has no symptoms of angina.  He is now approximately 1 year since his last drug-eluting stent to the distal vein graft to the RPDA.  I told him that he can choose to switch from Brilinta to Plavix if he chooses.  He will let us know.  Otherwise he will stay on his present medication.\par \par I would appreciate a copy of his most recent blood work.  According to the patient his cholesterol was 122, HDL 63, and LDL 53.\par \par The patient will schedule a repeat carotid Doppler.  If he does have any symptoms or problems he would call me.  Went over his medications and I answered all of his questions.  If all is well we will see him in 6 months.

## 2022-03-31 NOTE — HISTORY OF PRESENT ILLNESS
[FreeTextEntry1] : I saw Chano Monge in the office today for routine follow-up visit.  He is a 71-year-old male who is status post coronary bypass graft surgery in 1991 with a LIMA to the LAD, and vein graft to diagonal, OM, and RCA. In 2006 he underwent repeat cardiac catheterization. This showed that the internal mammary graft was closed . There was retrograde filling from a patent diagonal bypass graft to the LAD. This was compromised by an area of 50-70% stenosis. [facility] did not feel that this was amenable to angioplasty. Did go for a second opinion  to [facility] and saw Dr. Leavitt who felt that he might attempt this. Since the patient's symptoms were stable we elected not to do this.\par \par  The patient has had stable exertional angina on medication. On his  visit, 10/12,  the patient  noted that the angina was coming on more easily and more frequently. They were reminiscent of the symptoms he had prior to his bypass surgery 21 years ago. Also has associated shortness of breath. ECG  showed new T-wave inversions in the anterior leads. Patient was sent to [facility] for cardiac catheterization which was performed by Dr. Streeter. This showed that his LIMA graft was patent but had a 95% stenosis at the site of anastomosis to the LAD. All 3 native vessels were closed. The vein graft to the circumflex artery was closed, with a 40% lesion in the RCA graft. The patient underwent successful balloon angioplasty of the anastomotic site between the LIMA graft and LAD. The patient's angina had completely resolved since that procedure. A followup electrocardiogram performed in your office demonstrated that the T wave inversions in the anterior leads have resolved. The patient discontinued his Ranexa.\par \par 8/13  the patient had recurrent symptoms of angina. He underwent repeat cardiac catheterization. This showed 90% left main stenosis, and 100% stenosis of all native blood vessels. There was a 90% stenosis at the anastomosis between the LIMA graft and the LAD, 50% stenosis of the vein graft going to the PDA, and a normal vein graft to D1. A stent was placed to the anastomosis between LIMA and LAD.\par \par The patient continued to have stable exertional angina.\par \par Chemical nuclear stress test performed 7/20 showed mild anterolateral wall ischemia.  EF was 63%.\par \par Recently the patient developed increasing angina.  He underwent cardiac catheterization 4/21 which demonstrated a new 95% stenosis in the distal vein graft to the RPDA.  This was stented.  Patient is feeling better.  He has noted that his blood pressure also has been going up.\par \par Blood work 4/21 demonstrated normal CBC and chemistries.  Cholesterol is 113, triglycerides 37, HDL 56, and LDL 49.  Last visit I started him on losartan for hypertension.  I spoke with Dr. Villatoro.  We will continue the Brilinta for 3 months and then load with Plavix 600 mg and start Plavix 75 mg once a day.  The patient decided he will stay on the Brilinta.\par \par Patient's back pain is getting worse despite 3 epidural injections.  He still is able to exercise but walks at a slow rate.  He has no angina.  He is now taking gabapentin which is helping his peripheral neuropathy.  He reduced his Ranexa to 500 mg twice a day and is having no angina.  Patient continues on Brilinta but is thinking about switching to Plavix since when he is in the donut hole the Brilinta is quite expensive..\par \par

## 2022-04-01 ENCOUNTER — NON-APPOINTMENT (OUTPATIENT)
Age: 72
End: 2022-04-01

## 2022-04-15 ENCOUNTER — APPOINTMENT (OUTPATIENT)
Dept: PAIN MANAGEMENT | Facility: CLINIC | Age: 72
End: 2022-04-15
Payer: MEDICARE

## 2022-04-15 VITALS — HEIGHT: 63 IN | BODY MASS INDEX: 21.62 KG/M2 | WEIGHT: 122 LBS

## 2022-04-15 PROCEDURE — 99214 OFFICE O/P EST MOD 30 MIN: CPT

## 2022-04-15 RX ORDER — CLOBETASOL PROPIONATE 0.05% / NIACINAMIDE 4% 4; .05 G/100G; G/100G
0.05-4 OINTMENT TOPICAL
Refills: 0 | Status: ACTIVE | COMMUNITY

## 2022-04-15 RX ORDER — METHYLPREDNISOLONE 4 MG/1
4 TABLET ORAL
Refills: 0 | Status: ACTIVE | COMMUNITY

## 2022-04-15 RX ORDER — GABAPENTIN 100 MG
100 TABLET ORAL
Refills: 0 | Status: ACTIVE | COMMUNITY

## 2022-04-15 RX ORDER — CYCLOBENZAPRINE HYDROCHLORIDE 10 MG/1
10 TABLET, FILM COATED ORAL
Refills: 0 | Status: ACTIVE | COMMUNITY

## 2022-04-15 RX ORDER — RANOLAZINE 1000 MG/1
1000 TABLET, EXTENDED RELEASE ORAL
Refills: 0 | Status: ACTIVE | COMMUNITY

## 2022-04-15 RX ORDER — MELOXICAM 7.5 MG/1
7.5 TABLET ORAL
Refills: 0 | Status: ACTIVE | COMMUNITY

## 2022-04-15 NOTE — ASSESSMENT
[FreeTextEntry1] : The patient is stable on current pain medication with analgesia and without notable side effects or any obvious aberrant behaviors exhibited.   There is clinically meaningful improvement in pain and function that outweighs risks to patient safety.  I have discussed risks and realistic benefits of therapy and patient and clinician responsibilities for managing therapy.  \par Will renew medication today.\par \par I would increase the Neurontin in the morning. \par consider repeat injection in the future. \par \par After discussing various treatment options with the patient including but not limited to oral medications, physical therapy, exercise, modalities as well as interventional spinal injections, we have decided with the following plan:\par I personally reviewed the MRI/CT scan images and agree with the radiologist's report. The radiological findings were discussed with the patient.\par The risks, benefits, contents and alternatives to injection were explained in full to the patient. Risks outlined include but are not limited to infection,sepsis, bleeding, post-dural puncture headache, nerve damage, temporary increase in pain, syncopal episode, failure to resolve symptoms, allergic reaction, symptom recurrence, and elevation of blood sugar in diabetics. Cortisone may cause immunosuppression. Patient understands the risks. All questions were answered. After discussion of options, patient requested an injection. Information regarding the injection was given to the patient. Which medications to stop prior to the injection was explained to the patient as well.\par Follow up in 1-2 weeks post injection for re-evaluation. \par Continue Home exercises, stretching, activity modification, physical therapy, and conservative care.\par Patient is presenting with acute/sub-acute radicular pain with impairment in ADLs and functionality. The pain has not responded to conservative care including nsaid therapy and/or physical therapy. There is no bleeding tendency, unstable medical condition, or systemic infection.\par \par \par Left L4/5 L5/S1\par \par

## 2022-04-15 NOTE — HISTORY OF PRESENT ILLNESS
[Lower back] : lower back [6] : 6 [1] : 2 [Throbbing] : throbbing [Tightness] : tightness [] : yes [Intermittent] : intermittent [Rest] : rest [Ice] : ice [Standing] : standing [FreeTextEntry1] : 04/15/2022: follow up today after left L4/5 L5/S1 TFESI on 3/25/22. back pain is improved, however still with numbness during the day. taking gabapentin at night. \par \par \par 3/10/22: follow up today left L4/5 L4/5 TFESI on 2/11/22.He reports great relief for the first 2 weeks. Pain started to\par recur.\par \par 12/17/21: Patient presents for initial evaluation. He complains of pain in the left leg. He reports many years of back\par issues. (used to run marathons) about 10 years ago he used to garden and started to notice pain in the left knee.\par About 2-3 years ago he started walking as he could no longer run. he started noticing stiffness in the left leg. Pain got worse last year in the left leg. Had MRI (which was personally reviewed) Had MARTIN in April (Dr. Jones) with relief for about a week. Had stent also in April so now on Brilinta. N/t in the lower leg. No weakness. He has not yet had PT. He does stretch at home. Takes left over Oxycodone from his sons surgery.\par \par Subjective weakness: No\par Lower extremity paresthesias: Yes\par Bladder/bowel dysfunction:No\par Attempted modalities for current complaint:\par \par See above:\par Medications: Yes\par Gabapentin\par \par Injections: Yes - MARTIN Dr. Jones (4/2021)\par Left L4/5 L5/S1 TFESI (2/11/22, 3/25/22)\par \par Previous Spine Surgery: N/A\par Imaging:\par MRI Lumbar Spine (3/9/21) - ZP lumbar spondylosis, worse at L4/5 and L5/S1 with left sided nerve impingement at both levels. L4/5 DH, mild FA, severe left NF stenosis with impingement of the left L4 nerve root. moderate right NF stenosis. L5/S1 bulge with left DH compressing the left L5 and S1 nerve roots. [FreeTextEntry7] : Left leg

## 2022-04-18 ENCOUNTER — APPOINTMENT (OUTPATIENT)
Dept: GASTROENTEROLOGY | Facility: CLINIC | Age: 72
End: 2022-04-18
Payer: COMMERCIAL

## 2022-04-18 VITALS
BODY MASS INDEX: 20.82 KG/M2 | DIASTOLIC BLOOD PRESSURE: 70 MMHG | HEART RATE: 60 BPM | SYSTOLIC BLOOD PRESSURE: 100 MMHG | TEMPERATURE: 97.1 F | OXYGEN SATURATION: 99 % | HEIGHT: 62 IN | WEIGHT: 113.13 LBS

## 2022-04-18 DIAGNOSIS — Z80.0 FAMILY HISTORY OF MALIGNANT NEOPLASM OF DIGESTIVE ORGANS: ICD-10-CM

## 2022-04-18 DIAGNOSIS — Z12.11 ENCOUNTER FOR SCREENING FOR MALIGNANT NEOPLASM OF COLON: ICD-10-CM

## 2022-04-18 DIAGNOSIS — Z80.0 ENCOUNTER FOR SCREENING FOR MALIGNANT NEOPLASM OF COLON: ICD-10-CM

## 2022-04-18 PROCEDURE — 99204 OFFICE O/P NEW MOD 45 MIN: CPT

## 2022-04-18 RX ORDER — SODIUM PICOSULFATE, MAGNESIUM OXIDE, AND ANHYDROUS CITRIC ACID 10; 3.5; 12 MG/160ML; G/160ML; G/160ML
10-3.5-12 MG-GM LIQUID ORAL
Qty: 1 | Refills: 0 | Status: COMPLETED | COMMUNITY
Start: 2022-04-18 | End: 2022-04-20

## 2022-04-18 NOTE — PHYSICAL EXAM
[General Appearance - Alert] : alert [General Appearance - In No Acute Distress] : in no acute distress [Outer Ear] : the ears and nose were normal in appearance [Oropharynx] : the oropharynx was normal [Neck Appearance] : the appearance of the neck was normal [Neck Cervical Mass (___cm)] : no neck mass was observed [Jugular Venous Distention Increased] : there was no jugular-venous distention [Thyroid Diffuse Enlargement] : the thyroid was not enlarged [Thyroid Nodule] : there were no palpable thyroid nodules [Auscultation Breath Sounds / Voice Sounds] : lungs were clear to auscultation bilaterally [Heart Rate And Rhythm] : heart rate was normal and rhythm regular [Heart Sounds] : normal S1 and S2 [Heart Sounds Gallop] : no gallops [Murmurs] : no murmurs [Heart Sounds Pericardial Friction Rub] : no pericardial rub [Full Pulse] : the pedal pulses are present [Edema] : there was no peripheral edema [Abdomen Soft] : soft [Bowel Sounds] : normal bowel sounds [Abdomen Tenderness] : non-tender [] : no hepato-splenomegaly [Abdomen Mass (___ Cm)] : no abdominal mass palpated

## 2022-04-19 ENCOUNTER — APPOINTMENT (OUTPATIENT)
Dept: CARDIOLOGY | Facility: CLINIC | Age: 72
End: 2022-04-19
Payer: MEDICARE

## 2022-04-19 PROCEDURE — 93880 EXTRACRANIAL BILAT STUDY: CPT

## 2022-04-20 RX ORDER — TICAGRELOR 90 MG/1
90 TABLET ORAL
Qty: 180 | Refills: 3 | Status: DISCONTINUED | COMMUNITY
Start: 2021-06-15 | End: 2022-04-20

## 2022-04-20 RX ORDER — TICAGRELOR 90 MG/1
90 TABLET ORAL
Refills: 0 | Status: DISCONTINUED | COMMUNITY
End: 2022-04-20

## 2022-04-28 DIAGNOSIS — Z11.52 ENCOUNTER FOR SCREENING FOR COVID-19: ICD-10-CM

## 2022-04-28 DIAGNOSIS — Z01.818 ENCOUNTER FOR OTHER PREPROCEDURAL EXAMINATION: ICD-10-CM

## 2022-05-11 ENCOUNTER — RX RENEWAL (OUTPATIENT)
Age: 72
End: 2022-05-11

## 2022-05-25 ENCOUNTER — RX RENEWAL (OUTPATIENT)
Age: 72
End: 2022-05-25

## 2022-05-27 ENCOUNTER — APPOINTMENT (OUTPATIENT)
Dept: PAIN MANAGEMENT | Facility: CLINIC | Age: 72
End: 2022-05-27
Payer: MEDICARE

## 2022-05-27 ENCOUNTER — RX CHANGE (OUTPATIENT)
Age: 72
End: 2022-05-27

## 2022-05-27 VITALS — WEIGHT: 113 LBS | BODY MASS INDEX: 20.8 KG/M2 | HEIGHT: 62 IN

## 2022-05-27 PROCEDURE — 99213 OFFICE O/P EST LOW 20 MIN: CPT

## 2022-05-27 RX ORDER — DICLOFENAC POTASSIUM 50 MG/1
50 TABLET, COATED ORAL
Qty: 60 | Refills: 0 | Status: ACTIVE | COMMUNITY
Start: 2022-05-27 | End: 1900-01-01

## 2022-06-09 NOTE — HISTORY OF PRESENT ILLNESS
[Lower back] : lower back [Throbbing] : throbbing [Tightness] : tightness [Intermittent] : intermittent [Rest] : rest [Ice] : ice [Standing] : standing [8] : 8 [4] : 4 [Sleep] : sleep [Lying in bed] : lying in bed [Retired] : Work status: retired [FreeTextEntry1] : 05/27/2022: follow up today.  He has improvement of numbness from gabapentin.  Would like PT before he has another injection.\par \par 04/15/2022: follow up today after left L4/5 L5/S1 TFESI on 3/25/22. back pain is improved, however still with numbness during the day. taking gabapentin at night. \par \par \par 3/10/22: follow up today left L4/5 L4/5 TFESI on 2/11/22.He reports great relief for the first 2 weeks. Pain started to\par recur.\par \par 12/17/21: Patient presents for initial evaluation. He complains of pain in the left leg. He reports many years of back\par issues. (used to run marathons) about 10 years ago he used to garden and started to notice pain in the left knee.\par About 2-3 years ago he started walking as he could no longer run. he started noticing stiffness in the left leg. Pain got worse last year in the left leg. Had MRI (which was personally reviewed) Had MARTIN in April (Dr. Jones) with relief for about a week. Had stent also in April so now on Brilinta. N/t in the lower leg. No weakness. He has not yet had PT. He does stretch at home. Takes left over Oxycodone from his sons surgery.\par \par Subjective weakness: No\par Lower extremity paresthesias: Yes\par Bladder/bowel dysfunction:No\par Attempted modalities for current complaint:\par \par See above:\par Medications: Yes\par Gabapentin\par \par Injections: Yes - MARTIN Dr. Jones (4/2021)\par Left L4/5 L5/S1 TFESI (2/11/22, 3/25/22)\par \par Previous Spine Surgery: N/A\par Imaging:\par MRI Lumbar Spine (3/9/21) - ZP lumbar spondylosis, worse at L4/5 and L5/S1 with left sided nerve impingement at both levels. L4/5 DH, mild FA, severe left NF stenosis with impingement of the left L4 nerve root. moderate right NF stenosis. L5/S1 bulge with left DH compressing the left L5 and S1 nerve roots. [] : no [FreeTextEntry7] : Left leg  [FreeTextEntry9] : movement  [de-identified] : mri l spine

## 2022-07-12 DIAGNOSIS — Z20.822 ENCOUNTER FOR PREPROCEDURAL LABORATORY EXAMINATION: ICD-10-CM

## 2022-07-12 DIAGNOSIS — Z01.812 ENCOUNTER FOR PREPROCEDURAL LABORATORY EXAMINATION: ICD-10-CM

## 2022-07-13 ENCOUNTER — RX RENEWAL (OUTPATIENT)
Age: 72
End: 2022-07-13

## 2022-07-15 ENCOUNTER — APPOINTMENT (OUTPATIENT)
Dept: PAIN MANAGEMENT | Facility: CLINIC | Age: 72
End: 2022-07-15

## 2022-08-06 ENCOUNTER — LABORATORY RESULT (OUTPATIENT)
Age: 72
End: 2022-08-06

## 2022-08-09 NOTE — HISTORY OF PRESENT ILLNESS
[Heartburn] : denies heartburn [Nausea] : denies nausea [Vomiting] : denies vomiting [Diarrhea] : denies diarrhea [Constipation] : denies constipation [Yellow Skin Or Eyes (Jaundice)] : denies jaundice [Abdominal Swelling] : denies abdominal swelling [Rectal Pain] : denies rectal pain [GERD] : no gastroesophageal reflux disease [Hiatus Hernia] : no hiatus hernia [Peptic Ulcer Disease] : no peptic ulcer disease [Pancreatitis] : no pancreatitis [Cholelithiasis] : no cholelithiasis [Kidney Stone] : no kidney stone [Inflammatory Bowel Disease] : no inflammatory bowel disease [Irritable Bowel Syndrome] : no irritable bowel syndrome [Diverticulitis] : no diverticulitis [Alcohol Abuse] : no alcohol abuse [Malignancy] : no malignancy [Abdominal Surgery] : no abdominal surgery [Appendectomy] : no appendectomy [Cholecystectomy] : no cholecystectomy [de-identified] : 71-year-old male with history of coronary artery disease here for follow-up [place] colonoscopy.  Has 3 siblings who  of colon cancer.  He has been getting colonoscopies every 3 years but his last one was 4 years ago.  He has never had any polyps of his own.  Significant cardiac history status post CABG and subsequent catheterizations with angioplasty and stents.  Last drug-eluting stent was in 2021.  He has been on aspirin and Brilinta but is due to be changed to Plavix for cost savings.  No longer having any chest pain or dyspnea on exertion.  GI complaints.  Bowel movements normal.

## 2022-08-09 NOTE — ASSESSMENT
[FreeTextEntry1] : Impression: Strong family history of colon cancer although patient himself has never had polyps.  Has been getting colonoscopies every 3 years.  It has been 4 years since his last exam.  No GI complaints.  Significant cardiac history but had a stent 1 year ago and seems to be doing well from the cardiac standpoint.  Required Brilinta initially but now able to switch to Plavix to save on prescription costs.\par \par Plan: We will schedule screening colonoscopy with Clenpiq prep.  Alternative prescription prep if necessary, use MiraLAX prep if nothing covered.  Patient will need cardiac clearance and will need to hold Brilinta/Plavix for 5 days prior to procedure.  Do not hold ASA

## 2022-08-10 ENCOUNTER — APPOINTMENT (OUTPATIENT)
Dept: GASTROENTEROLOGY | Facility: AMBULATORY MEDICAL SERVICES | Age: 72
End: 2022-08-10

## 2022-08-10 PROCEDURE — 45378 DIAGNOSTIC COLONOSCOPY: CPT | Mod: PT

## 2022-08-11 ENCOUNTER — NON-APPOINTMENT (OUTPATIENT)
Age: 72
End: 2022-08-11

## 2022-09-23 ENCOUNTER — APPOINTMENT (OUTPATIENT)
Dept: PAIN MANAGEMENT | Facility: CLINIC | Age: 72
End: 2022-09-23

## 2022-09-23 VITALS — WEIGHT: 116 LBS | BODY MASS INDEX: 21.35 KG/M2 | HEIGHT: 62 IN

## 2022-09-23 PROCEDURE — 99214 OFFICE O/P EST MOD 30 MIN: CPT

## 2022-09-23 NOTE — HISTORY OF PRESENT ILLNESS
[Lower back] : lower back [8] : 8 [Intermittent] : intermittent [Rest] : rest [Ice] : ice [Standing] : standing [Retired] : Work status: retired [5] : 5 [Dull/Aching] : dull/aching [Tingling] : tingling [Household chores] : household chores [Sleep] : sleep [Heat] : heat [Sitting] : sitting [FreeTextEntry1] : 09/23/2022: follow up today.  Would like to repeat epidural.\par \par 05/27/2022: follow up today.  He has improvement of numbness from gabapentin.  Would like PT before he has another injection.  PT did not help much.\par \par 04/15/2022: follow up today after left L4/5 L5/S1 TFESI on 3/25/22. back pain is improved, however still with numbness during the day. taking gabapentin at night. \par \par \par 3/10/22: follow up today left L4/5 L4/5 TFESI on 2/11/22.He reports great relief for the first 2 weeks. Pain started to\par recur.\par \par 12/17/21: Patient presents for initial evaluation. He complains of pain in the left leg. He reports many years of back\par issues. (used to run marathons) about 10 years ago he used to garden and started to notice pain in the left knee.\par About 2-3 years ago he started walking as he could no longer run. he started noticing stiffness in the left leg. Pain got worse last year in the left leg. Had MRI (which was personally reviewed) Had MARTIN in April (Dr. Jones) with relief for about a week. Had stent also in April so now on Brilinta. N/t in the lower leg. No weakness. He has not yet had PT. He does stretch at home. Takes left over Oxycodone from his sons surgery.\par \par Subjective weakness: No\par Lower extremity paresthesias: Yes\par Bladder/bowel dysfunction:No\par Attempted modalities for current complaint:\par \par See above:\par Medications: Yes\par Gabapentin\par \par Injections: Yes - MARTIN Dr. Jones (4/2021)\par Left L4/5 L5/S1 TFESI (2/11/22, 3/25/22)\par \par Previous Spine Surgery: N/A\par Imaging:\par MRI Lumbar Spine (3/9/21) - ZP lumbar spondylosis, worse at L4/5 and L5/S1 with left sided nerve impingement at both levels. L4/5 DH, mild FA, severe left NF stenosis with impingement of the left L4 nerve root. moderate right NF stenosis. L5/S1 bulge with left DH compressing the left L5 and S1 nerve roots. [] : no [FreeTextEntry6] : soreness , numbness  [FreeTextEntry7] : Left leg down to the calf [FreeTextEntry9] : stretching  [de-identified] : not moving from one  position  [de-identified] : mri l spine

## 2022-09-26 NOTE — PATIENT PROFILE ADULT - NSPRESCRALCFREQ_GEN_A_NUR
Call regarding: [employer] called - they are faxing a summary of benefits for nucala for this patient. Any questions call [phone].  Caller: Fariba    
4 or more times a week

## 2022-10-18 ENCOUNTER — NON-APPOINTMENT (OUTPATIENT)
Age: 72
End: 2022-10-18

## 2022-10-18 ENCOUNTER — APPOINTMENT (OUTPATIENT)
Dept: CARDIOLOGY | Facility: CLINIC | Age: 72
End: 2022-10-18

## 2022-10-18 VITALS
DIASTOLIC BLOOD PRESSURE: 75 MMHG | SYSTOLIC BLOOD PRESSURE: 123 MMHG | BODY MASS INDEX: 22.26 KG/M2 | OXYGEN SATURATION: 99 % | HEIGHT: 62 IN | HEART RATE: 63 BPM | WEIGHT: 121 LBS

## 2022-10-18 DIAGNOSIS — R07.89 OTHER CHEST PAIN: ICD-10-CM

## 2022-10-18 PROCEDURE — 93000 ELECTROCARDIOGRAM COMPLETE: CPT

## 2022-10-18 PROCEDURE — 99214 OFFICE O/P EST MOD 30 MIN: CPT

## 2022-10-18 NOTE — DISCUSSION/SUMMARY
[FreeTextEntry1] : From a cardiovascular standpoint, Yassine is doing well.  He has had no angina, and is no longer requiring Ranexa or nitroglycerin.  His main issues have been related to his sciatica.\par \par His blood pressure is at goal, and physical exam is unremarkable.  His EKG demonstrates a sinus rhythm, with an anterior T wave inversions, unchanged from prior tracings.\par \par He remains on Plavix, along with aspirin, despite being greater than 1 year after PCI to his RPDA.  He has had no issues with bleeding, and we will continue this regimen.\par \par He will remain on Lipitor, metoprolol, and losartan.  I see no contraindication to him proceeding with an epidural injection.  He will hold his Plavix 5 to 7 days prior.  He will have a repeat echocardiogram to confirm the absence of structural heart disease, and as part of risk stratification.\par \par I will see him again in 3 months.  If the epidural does not work, he is considering surgery. [EKG obtained to assist in diagnosis and management of assessed problem(s)] : EKG obtained to assist in diagnosis and management of assessed problem(s)

## 2022-10-18 NOTE — HISTORY OF PRESENT ILLNESS
[FreeTextEntry1] : I saw Chano Monge in the office today for routine follow-up visit.  \par \par He was last seen by Dr. Saldaña in 3/2022.\par \par He is a 71-year-old male who is status post coronary bypass graft surgery in 1991 with a LIMA to the LAD, and vein graft to diagonal, OM, and RCA. In 2006 he underwent repeat cardiac catheterization. This showed that the internal mammary graft was closed . There was retrograde filling from a patent diagonal bypass graft to the LAD. This was compromised by an area of 50-70% stenosis. [facility] did not feel that this was amenable to angioplasty. Did go for a second opinion  to [facility] and saw Dr. Leavitt who felt that he might attempt this. \par \par The patient has had stable exertional angina on medication. On his  visit, 10/12,  the patient  noted that the angina was coming on more easily and more frequently. They were reminiscent of the symptoms he had prior to his bypass surgery 21 years ago.  ECG  showed new T-wave inversions in the anterior leads. Patient was sent to [facility] for cardiac catheterization which was performed by Dr. Streeter. This showed that his LIMA graft was patent but had a 95% stenosis at the site of anastomosis to the LAD. All 3 native vessels were closed. The vein graft to the circumflex artery was closed, with a 40% lesion in the RCA graft. The patient underwent successful balloon angioplasty of the anastomotic site between the LIMA graft and LAD. The patient's angina had completely resolved since that procedure. \par \par In 2013, the patient had recurrent symptoms of angina. He underwent repeat cardiac catheterization. This showed 90% left main stenosis, and 100% stenosis of all native blood vessels. There was a 90% stenosis at the anastomosis between the LIMA graft and the LAD, 50% stenosis of the vein graft going to the PDA, and a normal vein graft to D1. A stent was placed to the anastomosis between LIMA and LAD.\par \par In 2021, he developed increasing angina.  He underwent cardiac catheterization 4/21 which demonstrated a new 95% stenosis in the distal vein graft to the RPDA.  This was stented. \par \par Today, he is here for follow-up.  He has no chest pain, difficulty breathing, or palpitations.  He is only able to exercise 20 minutes/day, because of issues with sciatica.  He is planning on getting injections, versus a laminectomy/fusion.  He has not had issues with angina.  He is not taking Ranexa.  He is also not using nitroglycerin.\par \par \par \par

## 2022-10-25 ENCOUNTER — APPOINTMENT (OUTPATIENT)
Dept: CARDIOLOGY | Facility: CLINIC | Age: 72
End: 2022-10-25

## 2022-10-25 PROCEDURE — 93306 TTE W/DOPPLER COMPLETE: CPT

## 2022-11-07 ENCOUNTER — RX RENEWAL (OUTPATIENT)
Age: 72
End: 2022-11-07

## 2022-11-18 ENCOUNTER — APPOINTMENT (OUTPATIENT)
Dept: PAIN MANAGEMENT | Facility: CLINIC | Age: 72
End: 2022-11-18

## 2022-11-18 PROCEDURE — 64483 NJX AA&/STRD TFRM EPI L/S 1: CPT | Mod: LT

## 2022-11-18 PROCEDURE — 64484 NJX AA&/STRD TFRM EPI L/S EA: CPT | Mod: LT

## 2022-11-18 NOTE — PROCEDURE
[FreeTextEntry3] : Date of Service: 11/18/2022 \par \par Account: [id number]\par \par Patient: JENNIFER LEONE \par \par YOB: 1950\par \par Age: 71 year\par \par \par Surgeon: Marcelina Granda M.D.\par \par Assistant: None.\par \par Pre-Operative Diagnosis: Lumbosacral Radiculitis (M54.17)\par \par Post Operative Diagnosis: Lumbosacral Radiculitis (M54.17)\par \par Procedure: Left L4-5, L5-S1 transforaminal epidural steroid injection under fluoroscopic guidance.        \par \par \par This procedure was carried out using fluoroscopic guidance.  The risks and benefits of the procedure were discussed extensively with the patient.  The consent of the patient was obtained and the following procedure was performed. The patient was placed in the prone position on the fluoroscopic table and the lumbar area was prepped and draped in a sterile fashion.\par \par The left L4-5 and L5-S1 neural foramen were identified on left oblique "jaspal dog" anatomical view at the 6 o' clock position using fluoroscopic guidance, and the area was marked. The overlying skin and subcutaneous structures were anesthetized using sterile technique with 1% Lidocaine.  A 22 gauge spinal needle was directed toward the inferior (6 o'clock) position of the pedicle, which formed the roof of the identified foramens.  Once in the epidural space, after negative aspiration for heme and CSF, 1cc of Omnipaque contrast was injected to confirm epidural location and assess filling defects and rule out intravascular needle placement. \par \par The following contrast flow and epidurogram was observed: no intravascular or intrathecal flow pattern was noted.  No blood or CSF was aspirated. Omnipaque spread appeared to outline the left L4 and L5 nerve roots and spread medially into the epidural space.  \par \par After this, an injectate of 3 cc preservative free normal saline plus 40 mg of Kenalog was injected in the epidural space at each of the two levels.\par \par The needle was subsequently removed.  Vital signs remained normal.  Pulse oximeter was used throughout the procedure and the patient's pulse and oxygen saturation remained within normal limits.  The patient tolerated the procedure well.  There were no complications.  The patient was instructed to apply ice over the injection sites for twenty minutes every two hours for the next 24 to 48 hours.  The patient was also instructed to contact me immediately if there were any problems.\hunter Granda M.D.

## 2022-12-02 ENCOUNTER — APPOINTMENT (OUTPATIENT)
Dept: PAIN MANAGEMENT | Facility: CLINIC | Age: 72
End: 2022-12-02

## 2022-12-02 VITALS — BODY MASS INDEX: 22.26 KG/M2 | HEIGHT: 62 IN | WEIGHT: 121 LBS

## 2022-12-02 PROCEDURE — 99213 OFFICE O/P EST LOW 20 MIN: CPT

## 2022-12-02 NOTE — ASSESSMENT
[FreeTextEntry1] : The patient is stable on current pain medication with analgesia and without notable side effects or any obvious aberrant behaviors exhibited.   There is clinically meaningful improvement in pain and function that outweighs risks to patient safety.  I have discussed risks and realistic benefits of therapy and patient and clinician responsibilities for managing therapy.  \par Will renew medication today.\par \par I would increase the Neurontin in the morning. \par consider repeat injection in the future. \par \par After discussing various treatment options with the patient including but not limited to oral medications, physical therapy, exercise, modalities as well as interventional spinal injections, we have decided with the following plan:\par I personally reviewed the MRI/CT scan images and agree with the radiologist's report. The radiological findings were discussed with the patient.\par The risks, benefits, contents and alternatives to injection were explained in full to the patient. Risks outlined include but are not limited to infection,sepsis, bleeding, post-dural puncture headache, nerve damage, temporary increase in pain, syncopal episode, failure to resolve symptoms, allergic reaction, symptom recurrence, and elevation of blood sugar in diabetics. Cortisone may cause immunosuppression. Patient understands the risks. All questions were answered. After discussion of options, patient requested an injection. Information regarding the injection was given to the patient. Which medications to stop prior to the injection was explained to the patient as well.\par Follow up in 1-2 weeks post injection for re-evaluation. \par Continue Home exercises, stretching, activity modification, physical therapy, and conservative care.\par Patient is presenting with acute/sub-acute radicular pain with impairment in ADLs and functionality. The pain has not responded to conservative care including nsaid therapy and/or physical therapy. There is no bleeding tendency, unstable medical condition, or systemic infection.\par \par \par Left L4/5 L5/S1- will call\par \par Surgical eval.\par \par

## 2022-12-02 NOTE — HISTORY OF PRESENT ILLNESS
[Lower back] : lower back [Tingling] : tingling [Intermittent] : intermittent [Household chores] : household chores [Rest] : rest [Ice] : ice [Heat] : heat [Sitting] : sitting [Standing] : standing [Retired] : Work status: retired [6] : 6 [4] : 4 [Dull/Aching] : dull/aching [Meds] : meds [Injection therapy] : injection therapy [FreeTextEntry1] : 12/02/22: follow up today after Left TFESI L4-5,L5-S1 on 11/18/22.  Had 60% relief.  Does aquatherapy which helps.  Would like to explore surgery.  \par \par 09/23/2022: follow up today.  Would like to repeat epidural.\par \par 05/27/2022: follow up today.  He has improvement of numbness from gabapentin.  Would like PT before he has another injection.  PT did not help much.\par \par 04/15/2022: follow up today after left L4/5 L5/S1 TFESI on 3/25/22. back pain is improved, however still with numbness during the day. taking gabapentin at night. \par \par \par 3/10/22: follow up today left L4/5 L4/5 TFESI on 2/11/22.He reports great relief for the first 2 weeks. Pain started to\par recur.\par \par 12/17/21: Patient presents for initial evaluation. He complains of pain in the left leg. He reports many years of back\par issues. (used to run marathons) about 10 years ago he used to garden and started to notice pain in the left knee.\par About 2-3 years ago he started walking as he could no longer run. he started noticing stiffness in the left leg. Pain got worse last year in the left leg. Had MRI (which was personally reviewed) Had MARTIN in April (Dr. Jones) with relief for about a week. Had stent also in April so now on Brilinta. N/t in the lower leg. No weakness. He has not yet had PT. He does stretch at home. Takes left over Oxycodone from his sons surgery.\par \par Subjective weakness: No\par Lower extremity paresthesias: Yes\par Bladder/bowel dysfunction:No\par Attempted modalities for current complaint:\par \par See above:\par Medications: Yes\par Gabapentin\par \par Injections: Yes - MARTIN Dr. Jones (4/2021)\par Left L4/5 L5/S1 TFESI (2/11/22, 3/25/22)\par \par Previous Spine Surgery: N/A\par Imaging:\par MRI Lumbar Spine (3/9/21) - ZP lumbar spondylosis, worse at L4/5 and L5/S1 with left sided nerve impingement at both levels. L4/5 DH, mild FA, severe left NF stenosis with impingement of the left L4 nerve root. moderate right NF stenosis. L5/S1 bulge with left DH compressing the left L5 and S1 nerve roots. [] : no [FreeTextEntry6] : soreness , numbness  [FreeTextEntry7] : Left leg down to the calf [FreeTextEntry9] : stretching  [de-identified] : not moving from one  position  [de-identified] : mri l spine

## 2023-01-03 ENCOUNTER — APPOINTMENT (OUTPATIENT)
Dept: ORTHOPEDIC SURGERY | Facility: CLINIC | Age: 73
End: 2023-01-03
Payer: MEDICARE

## 2023-01-03 VITALS — WEIGHT: 121 LBS | BODY MASS INDEX: 22.26 KG/M2 | HEIGHT: 62 IN

## 2023-01-03 DIAGNOSIS — M51.26 OTHER INTERVERTEBRAL DISC DISPLACEMENT, LUMBAR REGION: ICD-10-CM

## 2023-01-03 DIAGNOSIS — M54.16 RADICULOPATHY, LUMBAR REGION: ICD-10-CM

## 2023-01-03 PROCEDURE — 99215 OFFICE O/P EST HI 40 MIN: CPT

## 2023-01-10 ENCOUNTER — APPOINTMENT (OUTPATIENT)
Dept: CARDIOLOGY | Facility: CLINIC | Age: 73
End: 2023-01-10
Payer: MEDICARE

## 2023-01-10 ENCOUNTER — NON-APPOINTMENT (OUTPATIENT)
Age: 73
End: 2023-01-10

## 2023-01-10 VITALS
DIASTOLIC BLOOD PRESSURE: 78 MMHG | WEIGHT: 121 LBS | HEIGHT: 62 IN | OXYGEN SATURATION: 99 % | SYSTOLIC BLOOD PRESSURE: 157 MMHG | HEART RATE: 54 BPM | BODY MASS INDEX: 22.26 KG/M2

## 2023-01-10 VITALS — DIASTOLIC BLOOD PRESSURE: 60 MMHG | SYSTOLIC BLOOD PRESSURE: 110 MMHG

## 2023-01-10 DIAGNOSIS — E78.5 HYPERLIPIDEMIA, UNSPECIFIED: ICD-10-CM

## 2023-01-10 DIAGNOSIS — I25.10 ATHEROSCLEROTIC HEART DISEASE OF NATIVE CORONARY ARTERY W/OUT ANGINA PECTORIS: ICD-10-CM

## 2023-01-10 PROCEDURE — 93000 ELECTROCARDIOGRAM COMPLETE: CPT

## 2023-01-10 PROCEDURE — 99214 OFFICE O/P EST MOD 30 MIN: CPT

## 2023-01-12 NOTE — HISTORY OF PRESENT ILLNESS
[FreeTextEntry1] : I saw Chano Monge in the office today for routine follow-up visit.  He was last seen here in the office in 10/2022.\par \par He is a 72-year-old male who is status post coronary bypass graft surgery in 1991 with a LIMA to the LAD, and vein graft to diagonal, OM, and RCA. In 2006 he underwent repeat cardiac catheterization. This showed that the internal mammary graft was closed . There was retrograde filling from a patent diagonal bypass graft to the LAD. This was compromised by an area of 50-70% stenosis. [facility] did not feel that this was amenable to angioplasty. Did go for a second opinion  to [facility] and saw Dr. Leavitt who felt that he might attempt this. \par \par The patient has had stable exertional angina on medication. On his  visit, 10/12,  the patient  noted that the angina was coming on more easily and more frequently. They were reminiscent of the symptoms he had prior to his bypass surgery 21 years ago.  ECG  showed new T-wave inversions in the anterior leads. Patient was sent to [facility] for cardiac catheterization which was performed by Dr. Streeter. This showed that his LIMA graft was patent but had a 95% stenosis at the site of anastomosis to the LAD. All 3 native vessels were closed. The vein graft to the circumflex artery was closed, with a 40% lesion in the RCA graft. The patient underwent successful balloon angioplasty of the anastomotic site between the LIMA graft and LAD. The patient's angina had completely resolved since that procedure. \par \par In 2013, the patient had recurrent symptoms of angina. He underwent repeat cardiac catheterization. This showed 90% left main stenosis, and 100% stenosis of all native blood vessels. There was a 90% stenosis at the anastomosis between the LIMA graft and the LAD, 50% stenosis of the vein graft going to the PDA, and a normal vein graft to D1. A stent was placed to the anastomosis between LIMA and LAD.\par \par In 2021, he developed increasing angina.  He underwent cardiac catheterization 4/21 which demonstrated a new 95% stenosis in the distal vein graft to the RPDA.  This was stented. \par \par Today, he is here for follow-up.  He has no chest pain, difficulty breathing, or palpitations.  He has not been able to exercise much over the past year and half due to issues with sciatica. \par He started walking in the swimming pool 3-4 times a week .\par  He is planning on discectomy and spinal surgery possibly on February 10th 2023, he is awaiting confirmation.\par It will performed at [facility] with Dr Rose.\par \par He has not had issues with angina.  He is not taking Ranexa.  He is also not using nitroglycerin.\par \par \par \par

## 2023-01-12 NOTE — DISCUSSION/SUMMARY
[FreeTextEntry1] :  Mr Saleem is doing well From a cardiovascular standpoint.  He has had no angina, and is no longer requiring Ranexa or nitroglycerin.  His main issues have been related to his sciatica.\par \par His blood pressure is at goal, and physical exam is unremarkable.  His EKG demonstrates a sinus rhythm, with an anterior T wave inversions, unchanged from prior tracings.\par \par He remains on Plavix, along with aspirin, despite being greater than 1 year after PCI to his RPDA.  He has had no issues with bleeding, and we will continue this regimen.\par Regarding updated ischemic workup, He will have a pharmacologic nuclear stress test to assess for any new or worsening obstructive disease. Because of his current spinal disease, he is unable to be assessed on a treadmill.\par \par He will remain on Lipitor 80mg for goal LDL <70. He will continue metoprolol 25mg (1/2 tab), and losartan 25mg BID.  \par \par He will have blood work done at next annual physical forwarded to me.\par I see no contraindication to him proceeding with spinal surgery is stress testing is normal.  He will hold his Plavix 5 to 7 days prior.  He will continue ASA through perioperative period.\par \par I will see him again in 3-6 months if all continues to be well.

## 2023-01-17 ENCOUNTER — FORM ENCOUNTER (OUTPATIENT)
Age: 73
End: 2023-01-17

## 2023-01-25 ENCOUNTER — OUTPATIENT (OUTPATIENT)
Dept: OUTPATIENT SERVICES | Facility: HOSPITAL | Age: 73
LOS: 1 days | Discharge: HOME | End: 2023-01-25

## 2023-01-25 VITALS
RESPIRATION RATE: 17 BRPM | WEIGHT: 116.62 LBS | DIASTOLIC BLOOD PRESSURE: 85 MMHG | HEIGHT: 62 IN | HEART RATE: 64 BPM | SYSTOLIC BLOOD PRESSURE: 145 MMHG | TEMPERATURE: 97 F | OXYGEN SATURATION: 100 %

## 2023-01-25 DIAGNOSIS — M54.16 RADICULOPATHY, LUMBAR REGION: ICD-10-CM

## 2023-01-25 DIAGNOSIS — Z01.818 ENCOUNTER FOR OTHER PREPROCEDURAL EXAMINATION: ICD-10-CM

## 2023-01-25 DIAGNOSIS — M51.26 OTHER INTERVERTEBRAL DISC DISPLACEMENT, LUMBAR REGION: ICD-10-CM

## 2023-01-25 DIAGNOSIS — E78.5 HYPERLIPIDEMIA, UNSPECIFIED: ICD-10-CM

## 2023-01-25 DIAGNOSIS — I25.10 ATHEROSCLEROTIC HEART DISEASE OF NATIVE CORONARY ARTERY WITHOUT ANGINA PECTORIS: ICD-10-CM

## 2023-01-25 DIAGNOSIS — I10 ESSENTIAL (PRIMARY) HYPERTENSION: ICD-10-CM

## 2023-01-25 LAB
A1C WITH ESTIMATED AVERAGE GLUCOSE RESULT: 5.8 % — HIGH (ref 4–5.6)
ALBUMIN SERPL ELPH-MCNC: 4.3 G/DL — SIGNIFICANT CHANGE UP (ref 3.3–5)
ALP SERPL-CCNC: 60 U/L — SIGNIFICANT CHANGE UP (ref 40–120)
ALT FLD-CCNC: 54 U/L — SIGNIFICANT CHANGE UP (ref 12–78)
ANION GAP SERPL CALC-SCNC: 8 MMOL/L — SIGNIFICANT CHANGE UP (ref 5–17)
APTT BLD: 32.7 SEC — SIGNIFICANT CHANGE UP (ref 27.5–35.5)
AST SERPL-CCNC: 31 U/L — SIGNIFICANT CHANGE UP (ref 15–37)
BASOPHILS # BLD AUTO: 0.03 K/UL — SIGNIFICANT CHANGE UP (ref 0–0.2)
BASOPHILS NFR BLD AUTO: 0.6 % — SIGNIFICANT CHANGE UP (ref 0–2)
BILIRUB SERPL-MCNC: 0.9 MG/DL — SIGNIFICANT CHANGE UP (ref 0.2–1.2)
BUN SERPL-MCNC: 10 MG/DL — SIGNIFICANT CHANGE UP (ref 7–23)
CALCIUM SERPL-MCNC: 9.1 MG/DL — SIGNIFICANT CHANGE UP (ref 8.5–10.1)
CHLORIDE SERPL-SCNC: 99 MMOL/L — SIGNIFICANT CHANGE UP (ref 96–108)
CO2 SERPL-SCNC: 27 MMOL/L — SIGNIFICANT CHANGE UP (ref 22–31)
CREAT SERPL-MCNC: 0.75 MG/DL — SIGNIFICANT CHANGE UP (ref 0.5–1.3)
EGFR: 96 ML/MIN/1.73M2 — SIGNIFICANT CHANGE UP
EOSINOPHIL # BLD AUTO: 0.06 K/UL — SIGNIFICANT CHANGE UP (ref 0–0.5)
EOSINOPHIL NFR BLD AUTO: 1.1 % — SIGNIFICANT CHANGE UP (ref 0–6)
ESTIMATED AVERAGE GLUCOSE: 120 MG/DL — HIGH (ref 68–114)
GLUCOSE SERPL-MCNC: 102 MG/DL — HIGH (ref 70–99)
HCT VFR BLD CALC: 42.8 % — SIGNIFICANT CHANGE UP (ref 39–50)
HGB BLD-MCNC: 14.4 G/DL — SIGNIFICANT CHANGE UP (ref 13–17)
IMM GRANULOCYTES NFR BLD AUTO: 0.4 % — SIGNIFICANT CHANGE UP (ref 0–0.9)
INR BLD: 1 RATIO — SIGNIFICANT CHANGE UP (ref 0.88–1.16)
LYMPHOCYTES # BLD AUTO: 1.5 K/UL — SIGNIFICANT CHANGE UP (ref 1–3.3)
LYMPHOCYTES # BLD AUTO: 27.6 % — SIGNIFICANT CHANGE UP (ref 13–44)
MCHC RBC-ENTMCNC: 33.1 PG — SIGNIFICANT CHANGE UP (ref 27–34)
MCHC RBC-ENTMCNC: 33.6 G/DL — SIGNIFICANT CHANGE UP (ref 32–36)
MCV RBC AUTO: 98.4 FL — SIGNIFICANT CHANGE UP (ref 80–100)
MONOCYTES # BLD AUTO: 0.55 K/UL — SIGNIFICANT CHANGE UP (ref 0–0.9)
MONOCYTES NFR BLD AUTO: 10.1 % — SIGNIFICANT CHANGE UP (ref 2–14)
MRSA PCR RESULT.: SIGNIFICANT CHANGE UP
NEUTROPHILS # BLD AUTO: 3.28 K/UL — SIGNIFICANT CHANGE UP (ref 1.8–7.4)
NEUTROPHILS NFR BLD AUTO: 60.2 % — SIGNIFICANT CHANGE UP (ref 43–77)
NRBC # BLD: 0 /100 WBCS — SIGNIFICANT CHANGE UP (ref 0–0)
PLATELET # BLD AUTO: 221 K/UL — SIGNIFICANT CHANGE UP (ref 150–400)
POTASSIUM SERPL-MCNC: 4.1 MMOL/L — SIGNIFICANT CHANGE UP (ref 3.5–5.3)
POTASSIUM SERPL-SCNC: 4.1 MMOL/L — SIGNIFICANT CHANGE UP (ref 3.5–5.3)
PROT SERPL-MCNC: 7.8 GM/DL — SIGNIFICANT CHANGE UP (ref 6–8.3)
PROTHROM AB SERPL-ACNC: 12 SEC — SIGNIFICANT CHANGE UP (ref 10.5–13.4)
RBC # BLD: 4.35 M/UL — SIGNIFICANT CHANGE UP (ref 4.2–5.8)
RBC # FLD: 12.7 % — SIGNIFICANT CHANGE UP (ref 10.3–14.5)
S AUREUS DNA NOSE QL NAA+PROBE: SIGNIFICANT CHANGE UP
SODIUM SERPL-SCNC: 134 MMOL/L — LOW (ref 135–145)
VIT D25+D1,25 OH+D1,25 PNL SERPL-MCNC: 48 PG/ML — SIGNIFICANT CHANGE UP (ref 19.9–79.3)
WBC # BLD: 5.44 K/UL — SIGNIFICANT CHANGE UP (ref 3.8–10.5)
WBC # FLD AUTO: 5.44 K/UL — SIGNIFICANT CHANGE UP (ref 3.8–10.5)

## 2023-01-25 NOTE — H&P PST ADULT - ASSESSMENT
lumbar disc displacement   CAPRINI SCORE    AGE RELATED RISK FACTORS                                                       MOBILITY RELATED FACTORS  [ ] Age 41-60 years                                            (1 Point)                  [ ] Bed rest                                                        (1 Point)  [x ] Age: 61-74 years                                           (2 Points)                [ ] Plaster cast                                                   (2 Points)  [ ] Age= 75 years                                              (3 Points)                 [ ] Bed bound for more than 72 hours                   (2 Points)    DISEASE RELATED RISK FACTORS                                               GENDER SPECIFIC FACTORS  [ ] Edema in the lower extremities                       (1 Point)                  [ ] Pregnancy                                                     (1 Point)  [ ] Varicose veins                                               (1 Point)                  [ ] Post-partum < 6 weeks                                   (1 Point)             [ ] BMI > 25 Kg/m2                                            (1 Point)                  [ ] Hormonal therapy  or oral contraception            (1 Point)                 [ ] Sepsis (in the previous month)                        (1 Point)                  [ ] History of pregnancy complications  [ ] Pneumonia or serious lung disease                                               [ ] Unexplained or recurrent                       (1 Point)           (in the previous month)                               (1 Point)  [ ] Abnormal pulmonary function test                     (1 Point)                 SURGERY RELATED RISK FACTORS  [ ] Acute myocardial infarction                              (1 Point)                 [ ]  Section                                            (1 Point)  [ ] Congestive heart failure (in the previous month)  (1 Point)                 [ ] Minor surgery                                                 (1 Point)   [ ] Inflammatory bowel disease                             (1 Point)                 [x ] Arthroscopic surgery                                        (2 Points)  [ ] Central venous access                                    (2 Points)                [ ] General surgery lasting more than 45 minutes   (2 Points)       [ ] Stroke (in the previous month)                          (5 Points)               [ ] Elective arthroplasty                                        (5 Points)                                                                                                                                               HEMATOLOGY RELATED FACTORS                                                 TRAUMA RELATED RISK FACTORS  [ ] Prior episodes of VTE                                     (3 Points)                 [ ] Fracture of the hip, pelvis, or leg                       (5 Points)  [ ] Positive family history for VTE                         (3 Points)                 [ ] Acute spinal cord injury (in the previous month)  (5 Points)  [ ] Prothrombin 40363 A                                      (3 Points)                 [ ] Paralysis  (less than 1 month)                          (5 Points)  [ ] Factor V Leiden                                             (3 Points)                 [ ] Multiple Trauma within 1 month                         (5 Points)  [ ] Lupus anticoagulants                                     (3 Points)                                                           [ ] Anticardiolipin antibodies                                (3 Points)                                                       [ ] High homocysteine in the blood                      (3 Points)                                             [ ] Other congenital or acquired thrombophilia       (3 Points)                                                [ ] Heparin induced thrombocytopenia                  (3 Points)                                          Total Score [     4     ]  Caprini Score 0-2: Low risk, No VTE Prophylaxis required for most patient's, encourage ambulation  Caprini Score 3-6: At Risk, Pharmacologic VTE prophylaxis is indicated for most patients ( in the absence of a contraindication)  Caprini Score Greater than or = 7: High Risk , pharmacologic VTE is indicated for most patients ( in the absence of a contraindication)    Caprini score indicates that the patient is high risk for VTE event ( score 6 or greater). Surgical patient's in this group will benefit from both pharmacologic prophylaxis and intermittent compression devices . Surgical team will determine the balance between VTE  risk and bleeding risk and other clinical considerations

## 2023-01-25 NOTE — H&P PST ADULT - HISTORY OF PRESENT ILLNESS
73 yo male , pmh- CAD s/p CABG x 4  in 1991, htn, hld angioplasty  in 2013 and in January 2021 ( total of 2 cardiac stents) on Plavix - denies chest pains or Shortness of breath and swims 3x week . c/o back pains not responding to conservative management  - scheduled for foraminotomy    denies recent travels in the past 30 days. No fever, SOB, cough, flu like symptoms or body rash- covid screen  covid vaccine completed   flu vaccine

## 2023-01-27 ENCOUNTER — NON-APPOINTMENT (OUTPATIENT)
Age: 73
End: 2023-01-27

## 2023-01-30 ENCOUNTER — APPOINTMENT (OUTPATIENT)
Dept: CARDIOLOGY | Facility: CLINIC | Age: 73
End: 2023-01-30
Payer: MEDICARE

## 2023-01-30 PROCEDURE — A9500: CPT

## 2023-01-30 PROCEDURE — 78452 HT MUSCLE IMAGE SPECT MULT: CPT

## 2023-01-30 PROCEDURE — 93015 CV STRESS TEST SUPVJ I&R: CPT

## 2023-02-01 ENCOUNTER — NON-APPOINTMENT (OUTPATIENT)
Age: 73
End: 2023-02-01

## 2023-02-02 ENCOUNTER — FORM ENCOUNTER (OUTPATIENT)
Age: 73
End: 2023-02-02

## 2023-02-07 NOTE — HISTORY OF PRESENT ILLNESS
[Lower back] : lower back [Sudden] : sudden [7] : 7 [Dull/Aching] : dull/aching [Sitting] : sitting [Standing] : standing [Tingling] : tingling [de-identified] : left sided low back and leg pain;  has been recommended that her consider surgery having exhausted nonsurgical care;  has been given conflicting opinions;  presents with unilateral radicular symptoms as opposed to intractable axial back pain and bilateral leg symptoms;   [] : no [FreeTextEntry5] : JENNIFER LEONE is a 72 year old male presenting with low back pain that has been present since 2020. Has received 3 epidural injs in back with Dr. Granda, which helps temporaily. Gabapentin has been helping with sleep. Experiences tingling & numbness down left leg.  [FreeTextEntry6] : numbness [FreeTextEntry7] : Left Leg [de-identified] : MRI L. SPINE 2021 & 2022

## 2023-02-07 NOTE — IMAGING
[de-identified] : gait nonantalgic\par motor 5/5\par sensation I LT\par left L5 and L4 dermatome of pain\par reflexes symmetric\par \par

## 2023-02-07 NOTE — ASSESSMENT
[FreeTextEntry1] : the MRI (done at [facility])  shows well defined isolated pathology that correlates with the unilateral leg symptoms and signs;  there is  5-1 foraminal HNP and also left 4-5 foraminal stenosis more due to loss of disc height and SAP of L5 hypertrophy; the contralateral , asymptomatic side is not narrowed to any extent;  the coronal MRI images show complete or near complete left sided collapse at 4-5-1;  the right 4-5-1 side shows preserved height;  given the radicular alone symptoms I proposed a left sided 4-5-1 decompression ;  at 4-5 would be a laminoforaminotomy and at 5-1 it would be similar but I would seek out the presumed contained , herniation and remove that as well;  IF those measures do not provide a lifetime of relief or relieve it long enough or sufficiently enough then a two level AP fusion would be the best way to address the persisting stenosis due to loss of disc height;  and the proposed decompression would not 'burn any bridges' to doing the fusion at a later date;  since the 4-5-1 levels have collapsed on the left side there is a reduced chance of iatrogenic instability from a decompression alone;

## 2023-02-09 ENCOUNTER — TRANSCRIPTION ENCOUNTER (OUTPATIENT)
Age: 73
End: 2023-02-09

## 2023-02-10 ENCOUNTER — TRANSCRIPTION ENCOUNTER (OUTPATIENT)
Age: 73
End: 2023-02-10

## 2023-02-10 ENCOUNTER — OUTPATIENT (OUTPATIENT)
Dept: OUTPATIENT SERVICES | Facility: HOSPITAL | Age: 73
LOS: 1 days | Discharge: HOME | End: 2023-02-10

## 2023-02-10 ENCOUNTER — APPOINTMENT (OUTPATIENT)
Dept: ORTHOPEDIC SURGERY | Facility: HOSPITAL | Age: 73
End: 2023-02-10
Payer: MEDICARE

## 2023-02-10 VITALS
RESPIRATION RATE: 14 BRPM | TEMPERATURE: 98 F | OXYGEN SATURATION: 98 % | HEIGHT: 62 IN | SYSTOLIC BLOOD PRESSURE: 137 MMHG | WEIGHT: 117.07 LBS | HEART RATE: 57 BPM | DIASTOLIC BLOOD PRESSURE: 80 MMHG

## 2023-02-10 VITALS
HEART RATE: 62 BPM | TEMPERATURE: 97 F | RESPIRATION RATE: 12 BRPM | SYSTOLIC BLOOD PRESSURE: 149 MMHG | DIASTOLIC BLOOD PRESSURE: 74 MMHG | OXYGEN SATURATION: 97 %

## 2023-02-10 LAB — BLD GP AB SCN SERPL QL: SIGNIFICANT CHANGE UP

## 2023-02-10 PROCEDURE — 63047 LAM FACETEC & FORAMOT LUMBAR: CPT

## 2023-02-10 PROCEDURE — 63047 LAM FACETEC & FORAMOT LUMBAR: CPT | Mod: AS

## 2023-02-10 PROCEDURE — 63048 LAM FACETEC &FORAMOT EA ADDL: CPT

## 2023-02-10 PROCEDURE — 63048 LAM FACETEC &FORAMOT EA ADDL: CPT | Mod: AS

## 2023-02-10 DEVICE — SURGIFLO MATRIX WITH THROMBIN KIT: Type: IMPLANTABLE DEVICE | Site: LEFT LUMBAR SPINE | Status: FUNCTIONAL

## 2023-02-10 RX ORDER — RANOLAZINE 500 MG/1
1 TABLET, FILM COATED, EXTENDED RELEASE ORAL
Qty: 0 | Refills: 0 | DISCHARGE

## 2023-02-10 RX ORDER — SODIUM CHLORIDE 9 MG/ML
1000 INJECTION, SOLUTION INTRAVENOUS
Refills: 0 | Status: DISCONTINUED | OUTPATIENT
Start: 2023-02-10 | End: 2023-02-10

## 2023-02-10 RX ORDER — LOSARTAN POTASSIUM 100 MG/1
0.5 TABLET, FILM COATED ORAL
Qty: 0 | Refills: 0 | DISCHARGE

## 2023-02-10 RX ORDER — ASPIRIN/CALCIUM CARB/MAGNESIUM 324 MG
1 TABLET ORAL
Qty: 0 | Refills: 0 | DISCHARGE

## 2023-02-10 RX ORDER — SODIUM CHLORIDE 9 MG/ML
3 INJECTION INTRAMUSCULAR; INTRAVENOUS; SUBCUTANEOUS EVERY 8 HOURS
Refills: 0 | Status: DISCONTINUED | OUTPATIENT
Start: 2023-02-10 | End: 2023-02-10

## 2023-02-10 RX ORDER — HYDROMORPHONE HYDROCHLORIDE 2 MG/ML
0.5 INJECTION INTRAMUSCULAR; INTRAVENOUS; SUBCUTANEOUS
Refills: 0 | Status: DISCONTINUED | OUTPATIENT
Start: 2023-02-10 | End: 2023-02-10

## 2023-02-10 RX ORDER — NITROGLYCERIN 6.5 MG
1 CAPSULE, EXTENDED RELEASE ORAL
Qty: 0 | Refills: 0 | DISCHARGE

## 2023-02-10 RX ORDER — ATORVASTATIN CALCIUM 80 MG/1
1 TABLET, FILM COATED ORAL
Qty: 0 | Refills: 0 | DISCHARGE

## 2023-02-10 RX ORDER — OXYCODONE AND ACETAMINOPHEN 10; 325 MG/1; MG/1
10-325 TABLET ORAL
Qty: 40 | Refills: 0 | Status: ACTIVE | COMMUNITY
Start: 2023-02-10 | End: 1900-01-01

## 2023-02-10 RX ORDER — METOPROLOL TARTRATE 50 MG
0.5 TABLET ORAL
Qty: 0 | Refills: 0 | DISCHARGE

## 2023-02-10 RX ORDER — TIZANIDINE 4 MG/1
4 TABLET ORAL TWICE DAILY
Qty: 20 | Refills: 0 | Status: ACTIVE | COMMUNITY
Start: 2023-02-10 | End: 1900-01-01

## 2023-02-10 RX ORDER — CLOPIDOGREL BISULFATE 75 MG/1
1 TABLET, FILM COATED ORAL
Qty: 0 | Refills: 0 | DISCHARGE

## 2023-02-10 RX ADMIN — HYDROMORPHONE HYDROCHLORIDE 0.5 MILLIGRAM(S): 2 INJECTION INTRAMUSCULAR; INTRAVENOUS; SUBCUTANEOUS at 15:24

## 2023-02-10 RX ADMIN — SODIUM CHLORIDE 75 MILLILITER(S): 9 INJECTION, SOLUTION INTRAVENOUS at 16:09

## 2023-02-10 RX ADMIN — HYDROMORPHONE HYDROCHLORIDE 0.5 MILLIGRAM(S): 2 INJECTION INTRAMUSCULAR; INTRAVENOUS; SUBCUTANEOUS at 15:10

## 2023-02-10 RX ADMIN — SODIUM CHLORIDE 75 MILLILITER(S): 9 INJECTION, SOLUTION INTRAVENOUS at 15:02

## 2023-02-10 NOTE — BRIEF OPERATIVE NOTE - NSICDXBRIEFPREOP_GEN_ALL_CORE_FT
PRE-OP DIAGNOSIS:  Lumbar spinal stenosis 10-Feb-2023 14:57:12  Michelle Chavez  
Hyperlipidemia, unspecified hyperlipidemia type

## 2023-02-10 NOTE — ASU DISCHARGE PLAN (ADULT/PEDIATRIC) - MEDICATION INSTRUCTIONS
percocet 10mg one tab every 4-6 hours as needed for pain. tizanidine 4mg one tab twice daily as needed for muscle spasms.

## 2023-02-10 NOTE — ASU PATIENT PROFILE, ADULT - ANESTHESIA, PREVIOUS REACTION, PROFILE
"[facility] Observation [address] [place] [address] Medicine  History & Physical    Patient Name: Meredith Ramos  MRN: [id number]  Patient Class: OP- Observation  Admission Date: 2/7/2023  Attending Physician: Pao Willett MD   Primary Care Provider: Lisa Childress MD         Patient information was obtained from patient, past medical records and ER records.     Subjective:     Principal Problem:Supraventricular tachycardia    Chief Complaint:   Chief Complaint   Patient presents with    Dizziness     Pt from home. C/o dizziness, felt like she was going to "pass-out". Per EMS, SVT on arrival, received 12mg adenosine, went into a-fib with continued dizziness. Received 100 mg diltiazem. Pt now in sinus rhythm.         HPI: 71 yo F with no known significant PMHx who presents to the ED complaining of palpitations. Pt. Reports that was at her baseline wihtout any symptoms until about 4:00 pm today when she developed palpitations and lightheadedness. Pt. States she felt like she was going to pass out, so she sat down and checked her smart watch which reported a HR in the 150s. EMS was contacted and they reported SVT per ED, and the patient was given adenosine and repeat rhythm strip showed Afib. It is unclear if pt. Was truly in SVT vs. Afib with RVR with EMS as rhythm strip is no available. Pt. Denies any prior cardiac history/history of arrhthymias, but she has felt some palpitations in the past that have resolved on their own. She mentions one episode yesterday which she did have some palpitations, but they resolved after a few seconds. Of note, pt. Also receive a R knee joint steroid injection earlier on the morning of presentation.      Past Medical History:   Diagnosis Date    Diverticulitis     Kidney stone     Osteoporosis     ostenopenia        Past Surgical History:   Procedure Laterality Date    BREAST BIOPSY      x1    BUNIONECTOMY      Hammer toe repair    BUNIONECTOMY      COLECTOMY  2004    partial for " diverticulitis    COLECTOMY      partial for diverticulitis    HYSTERECTOMY  2013    complete laparoscopic hysterectomy at [facility].  No cancer.  Found 3cm fibroid on right ovary.    kidney stones      removal of breast implants         Review of patient's allergies indicates:  No Known Allergies    Current Facility-Administered Medications on File Prior to Encounter   Medication    [DISCONTINUED] triamcinolone acetonide injection 40 mg     Current Outpatient Medications on File Prior to Encounter   Medication Sig    calcium-vitamin D3 (OS- + D3) 500 mg(1,250mg) -200 unit per tablet Calcium 500 + D    cholecalciferol, vitamin D3, (VITAMIN D3) 25 mcg (1,000 unit) capsule     MAGNESIUM CITRATE ORAL Take 250 mg by mouth once daily.    vitamin E acetate (VITAMIN E ORAL) Take 400 mg by mouth once daily.    vitamin K2 100 mcg Cap Take by mouth.     Family History       Problem Relation (Age of Onset)    Breast cancer Sister (54)    Cancer Father    Colon cancer Maternal Grandfather    Hypertension Father    Uterine cancer Maternal Grandmother          Tobacco Use    Smoking status: Former     Types: Cigarettes     Quit date: 2003     Years since quittin.6    Smokeless tobacco: Never   Substance and Sexual Activity    Alcohol use: No    Drug use: No    Sexual activity: Never     Review of Systems   Constitutional:  Negative for activity change, appetite change, chills, fever and unexpected weight change.   HENT:  Negative for congestion and sore throat.    Respiratory:  Positive for shortness of breath. Negative for cough.    Cardiovascular:  Positive for palpitations. Negative for chest pain and leg swelling.   Gastrointestinal:  Negative for abdominal distention, abdominal pain, blood in stool, constipation, diarrhea, nausea and vomiting.   Genitourinary:  Negative for difficulty urinating, dysuria and hematuria.   Musculoskeletal:  Negative for myalgias.   Skin:  Negative  for color change and rash.   Neurological:  Positive for light-headedness. Negative for dizziness, tremors and seizures.   Objective:     Vital Signs (Most Recent):  Temp: 98.1 °F (36.7 °C) (02/07/23 1920)  Pulse: 65 (02/07/23 2130)  Resp: 18 (02/07/23 2100)  BP: 110/76 (02/07/23 2130)  SpO2: 97 % (02/07/23 2130) Vital Signs (24h Range):  Temp:  [98 °F (36.7 °C)-98.3 °F (36.8 °C)] 98.1 °F (36.7 °C)  Pulse:  [64-88] 65  Resp:  [18-20] 18  SpO2:  [95 %-98 %] 97 %  BP: (106-118)/(64-76) 110/76     Weight: 54.4 kg (119 lb 14.9 oz)  Body mass index is 19.96 kg/m².    Physical Exam  Vitals reviewed.   Constitutional:       General: She is not in acute distress.     Appearance: She is well-developed.   HENT:      Head: Normocephalic and atraumatic.   Eyes:      Extraocular Movements: Extraocular movements intact.      Pupils: Pupils are equal, round, and reactive to light.   Neck:      Vascular: No JVD.      Trachea: No tracheal deviation.   Cardiovascular:      Rate and Rhythm: Normal rate and regular rhythm.      Heart sounds: No murmur heard.    No friction rub. No gallop.   Pulmonary:      Effort: No respiratory distress.      Breath sounds: Normal breath sounds. No wheezing or rales.   Abdominal:      General: Bowel sounds are normal. There is no distension.      Palpations: Abdomen is soft. There is no mass.      Tenderness: There is no abdominal tenderness.   Musculoskeletal:         General: No deformity.      Cervical back: Neck supple.   Lymphadenopathy:      Cervical: No cervical adenopathy.   Skin:     General: Skin is warm and dry.      Findings: No rash.   Neurological:      Mental Status: She is alert and oriented to person, place, and time.         CRANIAL NERVES     CN III, IV, VI   Pupils are equal, round, and reactive to light.     Significant Labs: All pertinent labs within the past 24 hours have been reviewed.    Significant Imaging: I have reviewed all pertinent imaging results/findings within the  past 24 hours.    Assessment/Plan:     * Supraventricular tachycardia  -Pt. With palpitations at home, thought to be in SVT given adenosine, noted Afib after adenosine pushed-->Sinus with frequent PAC's. No prior known cardiac history. Given steroid joint injection earlier in day  -Unclear if pt. Was truly in SVT vs. Afib with RVR prior to adenosine  -Continue to monitor on telemetry, TTE ordered for further cardiac work-up  -Start low dose lopressor 12.5 mg BID for rate control. Case discussed with cardiology in ED who recommend EP consult in am  -[id number] based on age and sex alone, will start antiplatelet, discuss FD AC at discharge further with patient and cards      VTE Risk Mitigation (From admission, onward)         Ordered     enoxaparin injection 40 mg  Daily         02/07/23 2115     IP VTE HIGH RISK PATIENT  Once         02/07/23 2115     Place sequential compression device  Until discontinued         02/07/23 2115     Place sequential compression device  Until discontinued         02/07/23 2002                   Simone Francisco MD  Department of Hospital Medicine   [facility] 11H   none

## 2023-02-10 NOTE — ASU DISCHARGE PLAN (ADULT/PEDIATRIC) - ASU DC SPECIAL INSTRUCTIONSFT
ok to remove dressing post op day #3. Please leave mesh dressing on top of incision in place. Ok to shower with that uncovered. Do not apply any lotions, creams or soaps. Do not scrub or submerge wound.

## 2023-02-12 RX ORDER — ASPIRIN/CALCIUM CARB/MAGNESIUM 324 MG
1 TABLET ORAL
Qty: 0 | Refills: 0 | DISCHARGE
Start: 2023-02-12

## 2023-02-12 RX ORDER — CLOPIDOGREL BISULFATE 75 MG/1
1 TABLET, FILM COATED ORAL
Qty: 0 | Refills: 0 | DISCHARGE
Start: 2023-02-12

## 2023-02-16 DIAGNOSIS — Z95.1 PRESENCE OF AORTOCORONARY BYPASS GRAFT: ICD-10-CM

## 2023-02-16 DIAGNOSIS — Z95.5 PRESENCE OF CORONARY ANGIOPLASTY IMPLANT AND GRAFT: ICD-10-CM

## 2023-02-16 DIAGNOSIS — E78.00 PURE HYPERCHOLESTEROLEMIA, UNSPECIFIED: ICD-10-CM

## 2023-02-16 DIAGNOSIS — M54.16 RADICULOPATHY, LUMBAR REGION: ICD-10-CM

## 2023-02-16 DIAGNOSIS — I25.10 ATHEROSCLEROTIC HEART DISEASE OF NATIVE CORONARY ARTERY WITHOUT ANGINA PECTORIS: ICD-10-CM

## 2023-02-16 DIAGNOSIS — Z79.02 LONG TERM (CURRENT) USE OF ANTITHROMBOTICS/ANTIPLATELETS: ICD-10-CM

## 2023-02-16 DIAGNOSIS — K21.9 GASTRO-ESOPHAGEAL REFLUX DISEASE WITHOUT ESOPHAGITIS: ICD-10-CM

## 2023-02-16 DIAGNOSIS — I10 ESSENTIAL (PRIMARY) HYPERTENSION: ICD-10-CM

## 2023-02-16 DIAGNOSIS — Z79.82 LONG TERM (CURRENT) USE OF ASPIRIN: ICD-10-CM

## 2023-02-16 DIAGNOSIS — Z87.891 PERSONAL HISTORY OF NICOTINE DEPENDENCE: ICD-10-CM

## 2023-02-21 ENCOUNTER — APPOINTMENT (OUTPATIENT)
Dept: ORTHOPEDIC SURGERY | Facility: CLINIC | Age: 73
End: 2023-02-21
Payer: MEDICARE

## 2023-02-21 VITALS — HEIGHT: 62 IN | WEIGHT: 121 LBS | BODY MASS INDEX: 22.26 KG/M2

## 2023-02-21 PROCEDURE — 72100 X-RAY EXAM L-S SPINE 2/3 VWS: CPT

## 2023-02-21 PROCEDURE — 99024 POSTOP FOLLOW-UP VISIT: CPT

## 2023-02-23 NOTE — HISTORY OF PRESENT ILLNESS
[Lower back] : lower back [Sudden] : sudden [Dull/Aching] : dull/aching [Tingling] : tingling [Sitting] : sitting [Standing] : standing [3] : 3 [de-identified] : first pov s/p L L4-5-1 laminoforaminotomy and L5-S1 discectomy; states the back pain has improved, still with numbness and tingling in the distal LLE; states the associated pain with that has improved; some tightness in the L buttock and hamstring first thing in the morning when first wakes up; taking gabapentin at night; denies f/chills/drainage [] : no [FreeTextEntry5] : Pt here for PO #1. States he still feels numbness/tingling down left leg. \par \par  Left L4-L5 LAMINIFORAMINOTOMY; Left L5-S1 LAMINIFORAMINOTOMY & discectomy. [FreeTextEntry6] : numbness [FreeTextEntry7] : Left Leg [de-identified] : MRI L. SPINE 2021 & 2022

## 2023-02-23 NOTE — PHYSICAL EXAM
[Disc space narrowing] : Disc space narrowing [Scoliosis] : Scoliosis [FreeTextEntry1] : has not developed post op instability

## 2023-02-23 NOTE — ASSESSMENT
[FreeTextEntry1] : doing well 2 weeks s/p two level decompression; the back and leg component of pain has continued; advised the numbness and tingling could take months to resolve; continue gabapentin and f/u 1 month; may start PT at that time\par \par time will tell; if regretably the pain were to persist or recur then it would obligate interbody device placement ; and all options for doing that fusion will be discussed beforehand;  and again, emphasize PRN

## 2023-03-28 ENCOUNTER — APPOINTMENT (OUTPATIENT)
Dept: ORTHOPEDIC SURGERY | Facility: CLINIC | Age: 73
End: 2023-03-28
Payer: MEDICARE

## 2023-03-28 VITALS — BODY MASS INDEX: 22.26 KG/M2 | WEIGHT: 121 LBS | HEIGHT: 62 IN

## 2023-03-28 DIAGNOSIS — Z98.890 OTHER SPECIFIED POSTPROCEDURAL STATES: ICD-10-CM

## 2023-03-28 PROCEDURE — 99024 POSTOP FOLLOW-UP VISIT: CPT

## 2023-03-28 RX ORDER — GABAPENTIN 100 MG/1
100 CAPSULE ORAL
Qty: 270 | Refills: 0 | Status: ACTIVE | COMMUNITY
Start: 2022-05-09 | End: 1900-01-01

## 2023-03-28 NOTE — HISTORY OF PRESENT ILLNESS
[Lower back] : lower back [Sudden] : sudden [3] : 3 [Dull/Aching] : dull/aching [Tingling] : tingling [Sitting] : sitting [Standing] : standing [de-identified] : 3-28-23-  now 7 weeks post op  (DOS 2/10/23) continues to improve. still has some radicular complaints left ant thigh. He is requesting therapy and renewal on his gabapentin.\par \par first pov s/p L L4-5-1 laminoforaminotomy and L5-S1 discectomy; states the back pain has improved, still with numbness and tingling in the distal LLE; states the associated pain with that has improved; some tightness in the L buttock and hamstring first thing in the morning when first wakes up; taking gabapentin at night; denies f/chills/drainage [] : no [FreeTextEntry5] : Pt here for PO #1. States he still feels numbness/tingling down left leg. \par \par  Left L4-L5 LAMINIFORAMINOTOMY; Left L5-S1 LAMINIFORAMINOTOMY & discectomy. [FreeTextEntry6] : numbness [FreeTextEntry7] : Left Leg [de-identified] : MRI L. SPINE 2021 & 2022

## 2023-03-28 NOTE — ASSESSMENT
[FreeTextEntry1] : 3-28-23 now 7 weeks post op continued improvement. will get him into therapy and renew the gabapentin \par \par doing well 2 weeks s/p two level decompression; the back and leg component of pain has continued; advised the numbness and tingling could take months to resolve; continue gabapentin and f/u 1 month; may start PT at that time\par \par time will tell; if regretably the pain were to persist or recur then it would obligate interbody device placement ; and all options for doing that fusion will be discussed beforehand;  and again, emphasize PRN

## 2023-04-27 RX ORDER — METOPROLOL SUCCINATE 25 MG/1
25 TABLET, EXTENDED RELEASE ORAL
Qty: 45 | Refills: 2 | Status: ACTIVE | COMMUNITY
Start: 2019-09-05

## 2023-08-24 NOTE — H&P ADULT - NSHPSOCIALHISTORY_GEN_ALL_CORE
What Type Of Note Output Would You Prefer (Optional)?: Bullet Format Hpi Title: Evaluation of Skin Lesions How Severe Are Your Spot(S)?: mild Have Your Spot(S) Been Treated In The Past?: has not been treated Additional History: Pt is here for spots on her body (arm, chest and legs). Pt has no history. former smoker,  no alcohol, no drugs  lives former smoker,  alcohol: daily one glass of wine with dinner  no drugs  lives with wife   retired

## 2023-12-06 ENCOUNTER — RX RENEWAL (OUTPATIENT)
Age: 73
End: 2023-12-06

## 2023-12-06 RX ORDER — LOSARTAN POTASSIUM 25 MG/1
25 TABLET, FILM COATED ORAL
Qty: 180 | Refills: 3 | Status: ACTIVE | COMMUNITY
Start: 2023-12-06 | End: 1900-01-01

## 2023-12-06 RX ORDER — CLOPIDOGREL BISULFATE 75 MG/1
75 TABLET, FILM COATED ORAL DAILY
Qty: 90 | Refills: 3 | Status: ACTIVE | COMMUNITY
Start: 1900-01-01 | End: 1900-01-01

## 2023-12-06 RX ORDER — METOPROLOL SUCCINATE 25 MG/1
25 TABLET, EXTENDED RELEASE ORAL
Qty: 45 | Refills: 3 | Status: ACTIVE | COMMUNITY
Start: 2023-12-06 | End: 1900-01-01

## 2023-12-12 ENCOUNTER — NON-APPOINTMENT (OUTPATIENT)
Age: 73
End: 2023-12-12

## 2023-12-18 ENCOUNTER — RX RENEWAL (OUTPATIENT)
Age: 73
End: 2023-12-18

## 2024-02-16 ENCOUNTER — APPOINTMENT (OUTPATIENT)
Dept: CARDIOLOGY | Facility: CLINIC | Age: 74
End: 2024-02-16
Payer: MEDICARE

## 2024-02-16 ENCOUNTER — NON-APPOINTMENT (OUTPATIENT)
Age: 74
End: 2024-02-16

## 2024-02-16 VITALS
OXYGEN SATURATION: 97 % | WEIGHT: 118 LBS | HEIGHT: 62 IN | SYSTOLIC BLOOD PRESSURE: 151 MMHG | HEART RATE: 67 BPM | DIASTOLIC BLOOD PRESSURE: 74 MMHG | BODY MASS INDEX: 21.71 KG/M2

## 2024-02-16 VITALS — SYSTOLIC BLOOD PRESSURE: 128 MMHG | DIASTOLIC BLOOD PRESSURE: 68 MMHG

## 2024-02-16 DIAGNOSIS — I10 ESSENTIAL (PRIMARY) HYPERTENSION: ICD-10-CM

## 2024-02-16 DIAGNOSIS — I25.10 ATHEROSCLEROTIC HEART DISEASE OF NATIVE CORONARY ARTERY W/OUT ANGINA PECTORIS: ICD-10-CM

## 2024-02-16 PROCEDURE — 99214 OFFICE O/P EST MOD 30 MIN: CPT

## 2024-02-16 PROCEDURE — 93000 ELECTROCARDIOGRAM COMPLETE: CPT

## 2024-02-16 NOTE — HISTORY OF PRESENT ILLNESS
[FreeTextEntry1] : I saw Chano Monge in the office today for routine follow-up visit.  He was last seen here in the office in 1/23.  He is a 73-year-old male who is status post coronary bypass graft surgery in 1991 with a LIMA to the LAD, and vein graft to diagonal, OM, and RCA. In 2006 he underwent repeat cardiac catheterization. This showed that the internal mammary graft was closed . There was retrograde filling from a patent diagonal bypass graft to the LAD. This was compromised by an area of 50-70% stenosis. [facility] did not feel that this was amenable to angioplasty. Did go for a second opinion  to [facility] and saw Dr. Leavitt who felt that he might attempt this.   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 On his  visit, 10/12,  the patient  noted that the angina was coming on more easily and more frequently. They were reminiscent of the symptoms he had prior to his bypass surgery 21 years ago.  ECG  showed new T-wave inversions in the anterior leads. Patient was sent to [facility] for cardiac catheterization which was performed by Dr. Streeter. This showed that his LIMA graft was patent but had a 95% stenosis at the site of anastomosis to the LAD. All 3 native vessels were closed. The vein graft to the circumflex artery was closed, with a 40% lesion in the RCA graft. The patient underwent successful balloon angioplasty of the anastomotic site between the LIMA graft and LAD. The patient's angina had completely resolved since that procedure.   In 2013, the patient had recurrent symptoms of angina. He underwent repeat cardiac catheterization. This showed 90% left main stenosis, and 100% stenosis of all native blood vessels. There was a 90% stenosis at the anastomosis between the LIMA graft and the LAD, 50% stenosis of the vein graft going to the PDA, and a normal vein graft to D1. A stent was placed to the anastomosis between LIMA and LAD.  In 2021, he developed increasing angina.  He underwent cardiac catheterization 4/21 which demonstrated a new 95% stenosis in the distal vein graft to the RPDA.  This was stented.   Today, he is here for follow-up.  He has no chest pain, difficulty breathing, or palpitations.  He has not been able to exercise much over the past year and half due to issues with sciatica.  He is s/p discectomy and spinal surgery in 2/23  He has not had issues with angina.  He is not taking Ranexa.  He is also not using nitroglycerin. He has been dealing with a viral illness for the last month (congestion, cough, phlegm) and has not been as active.

## 2024-02-16 NOTE — CARDIOLOGY SUMMARY
[de-identified] :  SR, ant t wave abn [de-identified] : 10/2022\par  MAC\par  NML LV\par  NML RV\par  EF 65% [de-identified] : 2006, 2012, \par  2013 stent between anastomosis LAD and LIMA\par  2021 - new, 95% stenosis vein graft- PATTI to RPDA [de-identified] : 1991

## 2024-02-16 NOTE — DISCUSSION/SUMMARY
[FreeTextEntry1] : Chano is doing well fom a cardiovascular standpoint.  He has had no angina, and is no longer requiring Ranexa or nitroglycerin.  His main issues have been related to his back, and the viral illness that has been fighting for a month.  His blood pressure is at goal, and physical exam is unremarkable.  His EKG demonstrates a sinus rhythm, without worrisome findings.   He remains on Plavix, along with aspirin, despite being greater than 1 year after PCI to his RPDA.  He has had no issues with bleeding, and we will continue this regimen. His pharm stress suggested no ischemia in 2023.  He will remain on Lipitor 80mg for goal LDL <70. He will continue toprol 25mg (1/2 tab), and losartan 25mg BID. He is having BW next month, and will get me the results.   I will see him again in 6 months if all continues to be well. He will have an echo and carotid after the fall 2024. [EKG obtained to assist in diagnosis and management of assessed problem(s)] : EKG obtained to assist in diagnosis and management of assessed problem(s)

## 2024-02-29 ENCOUNTER — RX RENEWAL (OUTPATIENT)
Age: 74
End: 2024-02-29

## 2024-02-29 RX ORDER — ATORVASTATIN CALCIUM 80 MG/1
80 TABLET, FILM COATED ORAL
Qty: 90 | Refills: 3 | Status: ACTIVE | COMMUNITY
Start: 2023-12-18 | End: 1900-01-01

## 2024-05-09 ENCOUNTER — APPOINTMENT (OUTPATIENT)
Dept: ORTHOPEDIC SURGERY | Facility: CLINIC | Age: 74
End: 2024-05-09

## 2024-05-30 ENCOUNTER — APPOINTMENT (OUTPATIENT)
Dept: ORTHOPEDIC SURGERY | Facility: CLINIC | Age: 74
End: 2024-05-30
Payer: MEDICARE

## 2024-05-30 DIAGNOSIS — M65.342 TRIGGER FINGER, LEFT RING FINGER: ICD-10-CM

## 2024-05-30 PROCEDURE — 20550 NJX 1 TENDON SHEATH/LIGAMENT: CPT | Mod: LT

## 2024-05-30 PROCEDURE — 73140 X-RAY EXAM OF FINGER(S): CPT | Mod: LT

## 2024-05-30 PROCEDURE — 99213 OFFICE O/P EST LOW 20 MIN: CPT | Mod: 25

## 2024-05-30 NOTE — IMAGING
[de-identified] : LEFT HAND skin intact. no swelling. TTP to RF A1 pulley. wrist ROM: good extension, flexion. good pronation, supination. RF: good extension, limited flexion actively, but able to flex to full fist passively with locking. good flex/ext other digit. SILT median, ulnar, radial distributions. palpable radial pulse, brisk cap refill all digits. + locking of RF.   XRAYS OF LEFT RING FINGER: no acute displaced fracture or dislocation.

## 2024-05-30 NOTE — ASSESSMENT
[FreeTextEntry1] : The condition was explained to the patient. Discussed risks and benefits of treatment options for tenosynovitis - activity modification, NSAID, splint, steroid injection, or surgery. Patient would like to proceed with CSI for trigger finger. - Discussed risks, benefits, and alternatives as well as contents of injection. Risks include, but are not limited allergic reaction, flare reaction, injection site pain, bruising, numbness, increased blood sugar, skin discoloration, fat atrophy, tendon rupture, and infection. Risk of immune suppression and increased susceptibility to infection with steroid use. We discussed that too many injections may lead to weakening of the tendon and tendon rupture. Patient expressed understanding and would like to proceed with injection. - The skin over the LEFT ring finger A1 pulley was cleansed with alcohol and anesthetized with ethyl chloride. The flexor sheath was injected with 3mg of celestone, 0.5cc of 1% lidocaine. Site was dressed with a band-aid. Patient tolerated the procedure well. - discussed that it may take up to 1 week for symptoms to improve after CSI.  F/u PRN.

## 2024-05-30 NOTE — HISTORY OF PRESENT ILLNESS
[Dull/Aching] : dull/aching [Sharp] : sharp [Intermittent] : intermittent [de-identified] : 5/30/24: 74yo male (RHD) presents for LEFT ring finger pain and locking x 2 months. Reports an episode of hand stiffness after an overuse injury 6 years ago, for which he saw his PCP, who did an injection at the ?base of bilateral thumbs, and symptoms resolved gradually over 2 months. + Voltaren gel PRN pain.  Last seen 1/14/22 for RIGHT lateral epicondylitis.  Hx: Hearing loss. CAD s/p CABG (1991) + stent (2013, 2021). HTN. HLD. BPH. Sx: L4-5 Laminoforaminotomy + L5-S1 discectomy. [] : no [FreeTextEntry1] : LEFT ring finger  [FreeTextEntry5] : JENNIFER d[place] [RHD] 73 year old male is here today c/o LEFT ring finger pain and locking x 2 months w/o injury. states he h/o stiffness in hands, resolved with CSI treatments. no h/o CSI for left ring finger. using voltren gel PRN pain.

## 2024-07-25 ENCOUNTER — NON-APPOINTMENT (OUTPATIENT)
Age: 74
End: 2024-07-25

## 2024-08-12 ENCOUNTER — APPOINTMENT (OUTPATIENT)
Dept: ORTHOPEDIC SURGERY | Facility: CLINIC | Age: 74
End: 2024-08-12
Payer: MEDICARE

## 2024-08-12 DIAGNOSIS — M65.342 TRIGGER FINGER, LEFT RING FINGER: ICD-10-CM

## 2024-08-12 PROCEDURE — 99213 OFFICE O/P EST LOW 20 MIN: CPT | Mod: 25

## 2024-08-12 PROCEDURE — 20550 NJX 1 TENDON SHEATH/LIGAMENT: CPT | Mod: LT

## 2024-08-12 NOTE — HISTORY OF PRESENT ILLNESS
[de-identified] : 8/12/24: f/u LEFT ring trigger finger. s/p CSI on 5/30/24. reports injection resolved symptoms, but symptoms returned ~1 week ago.  5/30/24: 72yo male (RHD) presents for LEFT ring finger pain and locking x 2 months. Reports an episode of hand stiffness after an overuse injury 6 years ago, for which he saw his PCP, who did an injection at the ?base of bilateral thumbs, and symptoms resolved gradually over 2 months. + Voltaren gel PRN pain.  Last seen 1/14/22 for RIGHT lateral epicondylitis.  Hx: Hearing loss. CAD s/p CABG (1991) + stent (2013, 2021). HTN. HLD. BPH. Sx: L4-5 Laminoforaminotomy + L5-S1 discectomy. [FreeTextEntry1] : LEFT ring finger  [FreeTextEntry5] : JENNIFER is here today to follow up on his LEFT ring finger. states CSI wore off ~1 week ago. requesting repeat today.

## 2024-08-12 NOTE — IMAGING
[de-identified] : LEFT HAND skin intact. no swelling. TTP to RF A1 pulley. wrist ROM: good extension, flexion. good pronation, supination. RF: DIPJ mild flex contracture, otherwise good extension. flex to mid-palm. good flex/ext other digit. SILT median, ulnar, radial distributions. palpable radial pulse, brisk cap refill all digits. + catching of RF.

## 2024-08-12 NOTE — ASSESSMENT
[FreeTextEntry1] : Patient would like to repeat CSI for trigger finger. - Discussed risks, benefits, and alternatives as well as contents of injection. Risks include, but are not limited allergic reaction, flare reaction, injection site pain, bruising, numbness, increased blood sugar, skin discoloration, fat atrophy, tendon rupture, and infection. Risk of immune suppression and increased susceptibility to infection with steroid use. We discussed that too many injections may lead to weakening of the tendon and tendon rupture. Patient expressed understanding and would like to proceed with injection. - The skin over the LEFT ring finger A1 pulley was cleansed with alcohol and anesthetized with ethyl chloride. The flexor sheath was injected with 3mg of celestone, 0.5cc of 1% lidocaine. Site was dressed with a band-aid. Patient tolerated the procedure well. - discussed that it may take up to 1 week for symptoms to improve after CSI.  F/u PRN.

## 2024-08-14 ENCOUNTER — APPOINTMENT (OUTPATIENT)
Dept: CARDIOLOGY | Facility: CLINIC | Age: 74
End: 2024-08-14

## 2024-08-14 VITALS
WEIGHT: 112 LBS | SYSTOLIC BLOOD PRESSURE: 123 MMHG | OXYGEN SATURATION: 98 % | HEIGHT: 62 IN | DIASTOLIC BLOOD PRESSURE: 70 MMHG | BODY MASS INDEX: 20.61 KG/M2 | HEART RATE: 48 BPM

## 2024-08-14 DIAGNOSIS — I25.10 ATHEROSCLEROTIC HEART DISEASE OF NATIVE CORONARY ARTERY W/OUT ANGINA PECTORIS: ICD-10-CM

## 2024-08-14 DIAGNOSIS — I10 ESSENTIAL (PRIMARY) HYPERTENSION: ICD-10-CM

## 2024-08-14 DIAGNOSIS — E78.5 HYPERLIPIDEMIA, UNSPECIFIED: ICD-10-CM

## 2024-08-14 DIAGNOSIS — I20.9 ANGINA PECTORIS, UNSPECIFIED: ICD-10-CM

## 2024-08-14 PROCEDURE — 93000 ELECTROCARDIOGRAM COMPLETE: CPT

## 2024-08-14 PROCEDURE — 99214 OFFICE O/P EST MOD 30 MIN: CPT

## 2024-08-14 NOTE — DISCUSSION/SUMMARY
[FreeTextEntry1] : Chano arrives today for follow up and symptoms management. Prior visit history as noted. He arrives in no acute distress. He is euvolemic on exam.  ECG illsutrates sinus rhythm with ant t wave abn- unchanged from previous. He is experiencing symptoms that appear to be anginal equivalent. We reviewed his pharmacologic stress test from 1/2023 that did not demonstrate evidence of ischemia.   I have advised at this time he resume renexa 1000mg BID for symptoms management. He will undergo a precautionary echocardiogram to assess for new wall motion abnormalities. If symptoms do not improve with Renexa, He will likely need to undergo diagnostic cath. He will continue atorvastatin 80mg , his most recent LDL is at goal of 50. He will continue plavix 75mg/ASA 81 daily for 2nd prevention. Will continue current dose of metoprolol 12.5mg, not able to increase due to tendency for bradycardia.  He will continue losartan 25mg BID.  He is having BW next week and will get me the results.   He will follow up in 1month with Dr Montero. We will touch base regarding symptoms on Renexa [EKG obtained to assist in diagnosis and management of assessed problem(s)] : EKG obtained to assist in diagnosis and management of assessed problem(s)

## 2024-08-14 NOTE — HISTORY OF PRESENT ILLNESS
[FreeTextEntry1] : I saw Chano Monge in the office today for routine follow-up visit.  He was last seen here in the office in 1/23.  He is a 73-year-old male who is status post coronary bypass graft surgery in 1991 with a LIMA to the LAD, and vein graft to diagonal, OM, and RCA. In 2006 he underwent repeat cardiac catheterization. This showed that the internal mammary graft was closed . There was retrograde filling from a patent diagonal bypass graft to the LAD. This was compromised by an area of 50-70% stenosis. [facility] did not feel that this was amenable to angioplasty. Did go for a second opinion  to [facility] and saw Dr. Leavitt who felt that he might attempt this.   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 On his  visit, 10/12,  the patient  noted that the angina was coming on more easily and more frequently. They were reminiscent of the symptoms he had prior to his bypass surgery 21 years ago.  ECG  showed new T-wave inversions in the anterior leads. Patient was sent to [facility] for cardiac catheterization which was performed by Dr. Streeter. This showed that his LIMA graft was patent but had a 95% stenosis at the site of anastomosis to the LAD. All 3 native vessels were closed. The vein graft to the circumflex artery was closed, with a 40% lesion in the RCA graft. The patient underwent successful balloon angioplasty of the anastomotic site between the LIMA graft and LAD. The patient's angina had completely resolved since that procedure.   In 2013, the patient had recurrent symptoms of angina. He underwent repeat cardiac catheterization. This showed 90% left main stenosis, and 100% stenosis of all native blood vessels. There was a 90% stenosis at the anastomosis between the LIMA graft and the LAD, 50% stenosis of the vein graft going to the PDA, and a normal vein graft to D1. A stent was placed to the anastomosis between LIMA and LAD.  In 2021, he developed increasing angina.  He underwent cardiac catheterization 4/21 which demonstrated a new 95% stenosis in the distal vein graft to the RPDA.  This was stented.  He has not been able to exercise much over the past year and half due to issues with sciatica. He is s/p discectomy and spinal surgery in 2/23  Today, he is here for follow-up to discuss concerning symptoms. He has noticed experiencing more symptoms of chest pains that has been occurring while swimming.  Usually he had been able to swim several labs without requiring rest and one month ago,  symptoms were noticeable. He also had an episode of chest tightening while walking while walking long stretch at [facility].  Symptoms are familiar to him (anginal equivalent) but not to the full extent as previously noted and denies other associated symptoms. He denies worsening over the past month. He reports, symptoms like this were relieved with renexa in the past.  He is not taking Ranexa.  He is also not using nitroglycerin.

## 2024-08-14 NOTE — PHYSICAL EXAM
[Well Developed] : well developed [Well Nourished] : well nourished [No Acute Distress] : no acute distress [Normal Conjunctiva] : normal conjunctiva [Normal Venous Pressure] : normal venous pressure [No Carotid Bruit] : no carotid bruit [Normal S1, S2] : normal S1, S2 [No Murmur] : no murmur [No Rub] : no rub [No Gallop] : no gallop [Clear Lung Fields] : clear lung fields [Good Air Entry] : good air entry [No Respiratory Distress] : no respiratory distress  [Soft] : abdomen soft [Non Tender] : non-tender [No Masses/organomegaly] : no masses/organomegaly [Normal Bowel Sounds] : normal bowel sounds [Normal Gait] : normal gait [No Edema] : no edema [No Cyanosis] : no cyanosis [No Clubbing] : no clubbing [No Varicosities] : no varicosities [No Rash] : no rash [No Skin Lesions] : no skin lesions [Moves all extremities] : moves all extremities [No Focal Deficits] : no focal deficits [Normal Speech] : normal speech [Alert and Oriented] : alert and oriented [Normal memory] : normal memory [Rhythm Regular] : regular [Normal S1] : normal S1 [Normal S2] : normal S2 [___ +] : bilateral [unfilled]U+ pitting edema to the ankles [Right Carotid Bruit] : no bruit heard over the right carotid [Left Carotid Bruit] : no bruit heard over the left carotid [No Abnormalities] : the abdominal aorta was not enlarged and no bruit was heard

## 2024-08-14 NOTE — CARDIOLOGY SUMMARY
[de-identified] :  SR, ant t wave abn- unchanged  [de-identified] : 1/2023 pharm normal perfusion no ischemia [de-identified] : 10/2022\par  MAC\par  NML LV\par  NML RV\par  EF 65% [de-identified] : 2006, 2012, \par  2013 stent between anastomosis LAD and LIMA\par  2021 - new, 95% stenosis vein graft- PATTI to RPDA [de-identified] : 1991

## 2024-08-21 ENCOUNTER — APPOINTMENT (OUTPATIENT)
Dept: CARDIOLOGY | Facility: CLINIC | Age: 74
End: 2024-08-21
Payer: MEDICARE

## 2024-08-21 PROCEDURE — 93306 TTE W/DOPPLER COMPLETE: CPT

## 2024-08-22 ENCOUNTER — NON-APPOINTMENT (OUTPATIENT)
Age: 74
End: 2024-08-22

## 2024-09-25 ENCOUNTER — RX RENEWAL (OUTPATIENT)
Age: 74
End: 2024-09-25

## 2024-10-02 ENCOUNTER — NON-APPOINTMENT (OUTPATIENT)
Age: 74
End: 2024-10-02

## 2024-10-02 ENCOUNTER — APPOINTMENT (OUTPATIENT)
Dept: CARDIOLOGY | Facility: CLINIC | Age: 74
End: 2024-10-02
Payer: MEDICARE

## 2024-10-02 VITALS
HEART RATE: 52 BPM | BODY MASS INDEX: 21.71 KG/M2 | OXYGEN SATURATION: 99 % | SYSTOLIC BLOOD PRESSURE: 132 MMHG | HEIGHT: 62 IN | DIASTOLIC BLOOD PRESSURE: 73 MMHG | WEIGHT: 118 LBS

## 2024-10-02 DIAGNOSIS — I25.10 ATHEROSCLEROTIC HEART DISEASE OF NATIVE CORONARY ARTERY W/OUT ANGINA PECTORIS: ICD-10-CM

## 2024-10-02 DIAGNOSIS — R07.89 OTHER CHEST PAIN: ICD-10-CM

## 2024-10-02 PROCEDURE — 99214 OFFICE O/P EST MOD 30 MIN: CPT

## 2024-10-02 PROCEDURE — 93000 ELECTROCARDIOGRAM COMPLETE: CPT

## 2024-10-02 RX ORDER — RANOLAZINE 500 MG/1
500 TABLET, EXTENDED RELEASE ORAL
Qty: 180 | Refills: 3 | Status: ACTIVE | COMMUNITY
Start: 2024-10-02 | End: 1900-01-01

## 2024-10-02 NOTE — DISCUSSION/SUMMARY
[FreeTextEntry1] : Chano has reported 2 episodes of what sounds to be stable angina over the last few months, after a long walk at [facility].  He has not been bothered by symptoms when swimming, or walking on flat ground.  His blood pressure is at goal, and EKG unremarkable.  His recent echocardiogram confirmed normal LV function, with mild to moderate MR.  He is willing to retry Ranexa at a lower dose of 500 mg twice daily.  If there is no improvement, or he has additional dizziness, we will trial Imdur 30.  If the symptoms worry despite optimal medical therapy, we will discuss the option of repeat cardiac catheterization.  He will continue the remainder of his medications.  He will stay active, and eat right.  We will speak in 2 weeks to see how he is feeling. [EKG obtained to assist in diagnosis and management of assessed problem(s)] : EKG obtained to assist in diagnosis and management of assessed problem(s)

## 2024-10-02 NOTE — PHYSICAL EXAM
[Well Developed] : well developed [Well Nourished] : well nourished [No Acute Distress] : no acute distress [Normal Conjunctiva] : normal conjunctiva [Normal Venous Pressure] : normal venous pressure [No Carotid Bruit] : no carotid bruit [Normal S1, S2] : normal S1, S2 [No Murmur] : no murmur [No Rub] : no rub [No Gallop] : no gallop [Rhythm Regular] : regular [Normal S1] : normal S1 [Normal S2] : normal S2 [___ +] : bilateral [unfilled]U+ pitting edema to the ankles [No Abnormalities] : the abdominal aorta was not enlarged and no bruit was heard [Clear Lung Fields] : clear lung fields [Good Air Entry] : good air entry [No Respiratory Distress] : no respiratory distress  [Soft] : abdomen soft [Non Tender] : non-tender [No Masses/organomegaly] : no masses/organomegaly [Normal Bowel Sounds] : normal bowel sounds [Normal Gait] : normal gait [No Edema] : no edema [No Cyanosis] : no cyanosis [No Clubbing] : no clubbing [No Varicosities] : no varicosities [No Rash] : no rash [No Skin Lesions] : no skin lesions [Moves all extremities] : moves all extremities [No Focal Deficits] : no focal deficits [Normal Speech] : normal speech [Alert and Oriented] : alert and oriented [Normal memory] : normal memory [Right Carotid Bruit] : no bruit heard over the right carotid [Left Carotid Bruit] : no bruit heard over the left carotid

## 2024-10-02 NOTE — CARDIOLOGY SUMMARY
[de-identified] :  SR, ant t wave abn- unchanged  [de-identified] : 1/2023 pharm normal perfusion no ischemia [de-identified] : 10/2022\par  MAC\par  NML LV\par  NML RV\par  EF 65% [de-identified] : 2006, 2012, \par  2013 stent between anastomosis LAD and LIMA\par  2021 - new, 95% stenosis vein graft- PATTI to RPDA [de-identified] : 1991

## 2024-10-02 NOTE — HISTORY OF PRESENT ILLNESS
[FreeTextEntry1] : I saw Chano Monge in the office today for routine follow-up visit.   He is a 73-year-old male who is status post coronary bypass graft surgery in 1991 with a LIMA to the LAD, and vein graft to diagonal, OM, and RCA. In 2006 he underwent repeat cardiac catheterization. This showed that the internal mammary graft was closed . There was retrograde filling from a patent diagonal bypass graft to the LAD. This was compromised by an area of 50-70% stenosis. [facility] did not feel that this was amenable to angioplasty. Did go for a second opinion  to [facility] and saw Dr. Leavitt who felt that he might attempt this.   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 On his  visit, 10/12,  the patient  noted that the angina was coming on more easily and more frequently. They were reminiscent of the symptoms he had prior to his bypass surgery 21 years ago.  ECG  showed new T-wave inversions in the anterior leads. Patient was sent to [facility] for cardiac catheterization which was performed by Dr. Streeter. This showed that his LIMA graft was patent but had a 95% stenosis at the site of anastomosis to the LAD. All 3 native vessels were closed. The vein graft to the circumflex artery was closed, with a 40% lesion in the RCA graft. The patient underwent successful balloon angioplasty of the anastomotic site between the LIMA graft and LAD. The patient's angina had completely resolved since that procedure.   In 2013, the patient had recurrent symptoms of angina. He underwent repeat cardiac catheterization. This showed 90% left main stenosis, and 100% stenosis of all native blood vessels. There was a 90% stenosis at the anastomosis between the LIMA graft and the LAD, 50% stenosis of the vein graft going to the PDA, and a normal vein graft to D1. A stent was placed to the anastomosis between LIMA and LAD.  In 2021, he developed increasing angina.  He underwent cardiac catheterization 4/21 which demonstrated a new 95% stenosis in the distal vein graft to the RPDA.  This was stented.  He has not been able to exercise much over the past year and half due to issues with sciatica. He is s/p discectomy and spinal surgery in 2/23.  He last saw Clarisse in 8/24.  He was reporting some chest discomfort after walking a long distance at [facility]. He was given Ranexa, which he took two doses of though became dizzy, and self discontinued it. he had a similar episode of chest pressure/angina after getting back to [place] from [place]. He is walking on flat ground and swimming without issue. He has not needed or used nitroglycerin. Echo in 8/24 with normal LV function, mild to moderate MR.

## 2024-10-06 NOTE — DISCHARGE NOTE NURSING/CASE MANAGEMENT/SOCIAL WORK - NSPROEXTENSIONSOFSELF_GEN_A_NUR
Problem: Chronic Conditions and Co-morbidities  Goal: Patient's chronic conditions and co-morbidity symptoms are monitored and maintained or improved  10/6/2024 0717 by Etelvina Low RN  Outcome: Progressing  10/6/2024 0328 by Dave Caal RN  Outcome: Progressing     Problem: Discharge Planning  Goal: Discharge to home or other facility with appropriate resources  10/6/2024 0717 by Etelvina Low RN  Outcome: Progressing  10/6/2024 0328 by Dave Caal RN  Outcome: Progressing     Problem: Safety - Adult  Goal: Free from fall injury  10/6/2024 0717 by Etelvina Low RN  Outcome: Progressing  10/6/2024 0328 by Dave Caal RN  Outcome: Progressing     Problem: ABCDS Injury Assessment  Goal: Absence of physical injury  10/6/2024 0717 by Etelvina Low RN  Outcome: Progressing  10/6/2024 0328 by Dave Caal RN  Outcome: Progressing     Problem: Skin/Tissue Integrity  Goal: Absence of new skin breakdown  Description: 1.  Monitor for areas of redness and/or skin breakdown  2.  Assess vascular access sites hourly  3.  Every 4-6 hours minimum:  Change oxygen saturation probe site  4.  Every 4-6 hours:  If on nasal continuous positive airway pressure, respiratory therapy assess nares and determine need for appliance change or resting period.  10/6/2024 0717 by Etelvina Low RN  Outcome: Progressing  10/6/2024 0328 by Dave Caal RN  Outcome: Progressing      eyeglasses/hearing aid

## 2024-11-16 NOTE — REASON FOR VISIT
fair balance [Follow-Up - Clinic] : a clinic follow-up of [Angina Pectoris] : angina pectoris [Coronary Artery Disease] : coronary artery disease [CABG Follow-up] : bypass graft [Hyperlipidemia] : hyperlipidemia [FreeTextEntry1] : Recent stent placed to LIMA-LAD anastomosis

## 2024-12-18 ENCOUNTER — RX RENEWAL (OUTPATIENT)
Age: 74
End: 2024-12-18

## 2025-01-10 ENCOUNTER — TRANSCRIPTION ENCOUNTER (OUTPATIENT)
Age: 75
End: 2025-01-10

## 2025-01-10 ENCOUNTER — INPATIENT (INPATIENT)
Facility: HOSPITAL | Age: 75
LOS: 0 days | Discharge: HOME | DRG: 313 | End: 2025-01-11
Attending: INTERNAL MEDICINE | Admitting: INTERNAL MEDICINE
Payer: MEDICARE

## 2025-01-10 VITALS
SYSTOLIC BLOOD PRESSURE: 141 MMHG | TEMPERATURE: 98 F | WEIGHT: 117.95 LBS | DIASTOLIC BLOOD PRESSURE: 87 MMHG | OXYGEN SATURATION: 99 % | RESPIRATION RATE: 20 BRPM | HEART RATE: 51 BPM | HEIGHT: 61 IN

## 2025-01-10 DIAGNOSIS — R07.89 OTHER CHEST PAIN: ICD-10-CM

## 2025-01-10 DIAGNOSIS — I25.10 ATHEROSCLEROTIC HEART DISEASE OF NATIVE CORONARY ARTERY WITHOUT ANGINA PECTORIS: ICD-10-CM

## 2025-01-10 LAB
ANION GAP SERPL CALC-SCNC: 6 MMOL/L — SIGNIFICANT CHANGE UP (ref 5–17)
BUN SERPL-MCNC: 8 MG/DL — SIGNIFICANT CHANGE UP (ref 7–23)
CALCIUM SERPL-MCNC: 9.4 MG/DL — SIGNIFICANT CHANGE UP (ref 8.5–10.1)
CHLORIDE SERPL-SCNC: 99 MMOL/L — SIGNIFICANT CHANGE UP (ref 96–108)
CO2 SERPL-SCNC: 28 MMOL/L — SIGNIFICANT CHANGE UP (ref 22–31)
CREAT SERPL-MCNC: 0.78 MG/DL — SIGNIFICANT CHANGE UP (ref 0.5–1.3)
EGFR: 94 ML/MIN/1.73M2 — SIGNIFICANT CHANGE UP
EGFR: 94 ML/MIN/1.73M2 — SIGNIFICANT CHANGE UP
GLUCOSE SERPL-MCNC: 101 MG/DL — HIGH (ref 70–99)
HCT VFR BLD CALC: 42.6 % — SIGNIFICANT CHANGE UP (ref 39–50)
HGB BLD-MCNC: 14.5 G/DL — SIGNIFICANT CHANGE UP (ref 13–17)
MCHC RBC-ENTMCNC: 33.1 PG — SIGNIFICANT CHANGE UP (ref 27–34)
MCHC RBC-ENTMCNC: 34 G/DL — SIGNIFICANT CHANGE UP (ref 32–36)
MCV RBC AUTO: 97.3 FL — SIGNIFICANT CHANGE UP (ref 80–100)
NRBC # BLD: 0 /100 WBCS — SIGNIFICANT CHANGE UP (ref 0–0)
NRBC BLD-RTO: 0 /100 WBCS — SIGNIFICANT CHANGE UP (ref 0–0)
PLATELET # BLD AUTO: 220 K/UL — SIGNIFICANT CHANGE UP (ref 150–400)
POTASSIUM SERPL-MCNC: 4.1 MMOL/L — SIGNIFICANT CHANGE UP (ref 3.5–5.3)
POTASSIUM SERPL-SCNC: 4.1 MMOL/L — SIGNIFICANT CHANGE UP (ref 3.5–5.3)
RBC # BLD: 4.38 M/UL — SIGNIFICANT CHANGE UP (ref 4.2–5.8)
RBC # FLD: 12.3 % — SIGNIFICANT CHANGE UP (ref 10.3–14.5)
SODIUM SERPL-SCNC: 133 MMOL/L — LOW (ref 135–145)
WBC # BLD: 4.06 K/UL — SIGNIFICANT CHANGE UP (ref 3.8–10.5)
WBC # FLD AUTO: 4.06 K/UL — SIGNIFICANT CHANGE UP (ref 3.8–10.5)

## 2025-01-10 PROCEDURE — 92972 PERQ TRLUML CORONRY LITHOTRP: CPT

## 2025-01-10 PROCEDURE — 99152 MOD SED SAME PHYS/QHP 5/>YRS: CPT

## 2025-01-10 PROCEDURE — 93459 L HRT ART/GRFT ANGIO: CPT | Mod: 26,59

## 2025-01-10 PROCEDURE — 92978 ENDOLUMINL IVUS OCT C 1ST: CPT | Mod: 26,RC

## 2025-01-10 PROCEDURE — 92937 PRQ TRLUML REVSC CAB GRF 1: CPT | Mod: RC

## 2025-01-10 RX ORDER — ASPIRIN 325 MG
1 TABLET ORAL
Qty: 90 | Refills: 3
Start: 2025-01-10 | End: 2026-01-04

## 2025-01-10 RX ORDER — CLOPIDOGREL BISULFATE 75 MG/1
75 TABLET, FILM COATED ORAL EVERY 24 HOURS
Refills: 0 | Status: DISCONTINUED | OUTPATIENT
Start: 2025-01-11 | End: 2025-01-11

## 2025-01-10 RX ORDER — ASPIRIN 325 MG
81 TABLET ORAL EVERY 24 HOURS
Refills: 0 | Status: DISCONTINUED | OUTPATIENT
Start: 2025-01-11 | End: 2025-01-11

## 2025-01-10 RX ORDER — METOPROLOL SUCCINATE 50 MG/1
12.5 TABLET, EXTENDED RELEASE ORAL
Refills: 0 | Status: DISCONTINUED | OUTPATIENT
Start: 2025-01-10 | End: 2025-01-11

## 2025-01-10 RX ORDER — ATORVASTATIN CALCIUM 80 MG/1
80 TABLET, FILM COATED ORAL AT BEDTIME
Refills: 0 | Status: DISCONTINUED | OUTPATIENT
Start: 2025-01-10 | End: 2025-01-11

## 2025-01-10 RX ORDER — CLOPIDOGREL BISULFATE 75 MG/1
1 TABLET, FILM COATED ORAL
Qty: 90 | Refills: 3
Start: 2025-01-10 | End: 2026-01-04

## 2025-01-10 RX ORDER — RANOLAZINE 1000 MG/1
500 TABLET, FILM COATED, EXTENDED RELEASE ORAL
Refills: 0 | Status: DISCONTINUED | OUTPATIENT
Start: 2025-01-10 | End: 2025-01-11

## 2025-01-10 RX ORDER — RANOLAZINE 1000 MG/1
1 TABLET, FILM COATED, EXTENDED RELEASE ORAL
Refills: 0 | DISCHARGE

## 2025-01-10 RX ORDER — LOSARTAN POTASSIUM 100 MG/1
25 TABLET, FILM COATED ORAL
Refills: 0 | Status: DISCONTINUED | OUTPATIENT
Start: 2025-01-10 | End: 2025-01-11

## 2025-01-10 RX ADMIN — Medication 75 MILLILITER(S): at 11:14

## 2025-01-10 RX ADMIN — RANOLAZINE 500 MILLIGRAM(S): 1000 TABLET, FILM COATED, EXTENDED RELEASE ORAL at 19:46

## 2025-01-10 RX ADMIN — Medication 250 MILLILITER(S): at 11:05

## 2025-01-10 RX ADMIN — ATORVASTATIN CALCIUM 80 MILLIGRAM(S): 80 TABLET, FILM COATED ORAL at 21:23

## 2025-01-10 RX ADMIN — Medication 75 MILLILITER(S): at 15:23

## 2025-01-10 RX ADMIN — LOSARTAN POTASSIUM 25 MILLIGRAM(S): 100 TABLET, FILM COATED ORAL at 19:46

## 2025-01-11 ENCOUNTER — TRANSCRIPTION ENCOUNTER (OUTPATIENT)
Age: 75
End: 2025-01-11

## 2025-01-11 VITALS — SYSTOLIC BLOOD PRESSURE: 158 MMHG | HEART RATE: 58 BPM | DIASTOLIC BLOOD PRESSURE: 68 MMHG | RESPIRATION RATE: 14 BRPM

## 2025-01-11 LAB
ANION GAP SERPL CALC-SCNC: 9 MMOL/L — SIGNIFICANT CHANGE UP (ref 5–17)
BUN SERPL-MCNC: 11 MG/DL — SIGNIFICANT CHANGE UP (ref 7–23)
CALCIUM SERPL-MCNC: 8.7 MG/DL — SIGNIFICANT CHANGE UP (ref 8.5–10.1)
CHLORIDE SERPL-SCNC: 99 MMOL/L — SIGNIFICANT CHANGE UP (ref 96–108)
CO2 SERPL-SCNC: 24 MMOL/L — SIGNIFICANT CHANGE UP (ref 22–31)
CREAT SERPL-MCNC: 0.67 MG/DL — SIGNIFICANT CHANGE UP (ref 0.5–1.3)
EGFR: 99 ML/MIN/1.73M2 — SIGNIFICANT CHANGE UP
EGFR: 99 ML/MIN/1.73M2 — SIGNIFICANT CHANGE UP
GLUCOSE SERPL-MCNC: 85 MG/DL — SIGNIFICANT CHANGE UP (ref 70–99)
HCT VFR BLD CALC: 38.4 % — LOW (ref 39–50)
HGB BLD-MCNC: 13.2 G/DL — SIGNIFICANT CHANGE UP (ref 13–17)
MCHC RBC-ENTMCNC: 33.3 PG — SIGNIFICANT CHANGE UP (ref 27–34)
MCHC RBC-ENTMCNC: 34.4 G/DL — SIGNIFICANT CHANGE UP (ref 32–36)
MCV RBC AUTO: 97 FL — SIGNIFICANT CHANGE UP (ref 80–100)
NRBC # BLD: 0 /100 WBCS — SIGNIFICANT CHANGE UP (ref 0–0)
NRBC BLD-RTO: 0 /100 WBCS — SIGNIFICANT CHANGE UP (ref 0–0)
PLATELET # BLD AUTO: 187 K/UL — SIGNIFICANT CHANGE UP (ref 150–400)
POTASSIUM SERPL-MCNC: 4.4 MMOL/L — SIGNIFICANT CHANGE UP (ref 3.5–5.3)
POTASSIUM SERPL-SCNC: 4.4 MMOL/L — SIGNIFICANT CHANGE UP (ref 3.5–5.3)
RBC # BLD: 3.96 M/UL — LOW (ref 4.2–5.8)
RBC # FLD: 12.2 % — SIGNIFICANT CHANGE UP (ref 10.3–14.5)
SODIUM SERPL-SCNC: 132 MMOL/L — LOW (ref 135–145)
WBC # BLD: 6.48 K/UL — SIGNIFICANT CHANGE UP (ref 3.8–10.5)
WBC # FLD AUTO: 6.48 K/UL — SIGNIFICANT CHANGE UP (ref 3.8–10.5)

## 2025-01-11 PROCEDURE — C1894: CPT

## 2025-01-11 PROCEDURE — C1874: CPT

## 2025-01-11 PROCEDURE — C1887: CPT

## 2025-01-11 PROCEDURE — C1761: CPT

## 2025-01-11 PROCEDURE — 92937 PRQ TRLUML REVSC CAB GRF 1: CPT | Mod: RC

## 2025-01-11 PROCEDURE — 36415 COLL VENOUS BLD VENIPUNCTURE: CPT

## 2025-01-11 PROCEDURE — C1753: CPT

## 2025-01-11 PROCEDURE — 93005 ELECTROCARDIOGRAM TRACING: CPT

## 2025-01-11 PROCEDURE — C1725: CPT

## 2025-01-11 PROCEDURE — 93459 L HRT ART/GRFT ANGIO: CPT | Mod: 59

## 2025-01-11 PROCEDURE — C1760: CPT

## 2025-01-11 PROCEDURE — 80048 BASIC METABOLIC PNL TOTAL CA: CPT

## 2025-01-11 PROCEDURE — C1769: CPT

## 2025-01-11 PROCEDURE — 93010 ELECTROCARDIOGRAM REPORT: CPT

## 2025-01-11 PROCEDURE — 92978 ENDOLUMINL IVUS OCT C 1ST: CPT | Mod: RC

## 2025-01-11 PROCEDURE — 92972 PERQ TRLUML CORONRY LITHOTRP: CPT

## 2025-01-11 PROCEDURE — 85027 COMPLETE CBC AUTOMATED: CPT

## 2025-01-11 RX ADMIN — CLOPIDOGREL BISULFATE 75 MILLIGRAM(S): 75 TABLET, FILM COATED ORAL at 08:53

## 2025-01-11 RX ADMIN — LOSARTAN POTASSIUM 25 MILLIGRAM(S): 100 TABLET, FILM COATED ORAL at 05:34

## 2025-01-11 RX ADMIN — RANOLAZINE 500 MILLIGRAM(S): 1000 TABLET, FILM COATED, EXTENDED RELEASE ORAL at 06:51

## 2025-01-11 RX ADMIN — Medication 81 MILLIGRAM(S): at 08:52

## 2025-01-11 RX ADMIN — METOPROLOL SUCCINATE 12.5 MILLIGRAM(S): 50 TABLET, EXTENDED RELEASE ORAL at 08:54

## 2025-01-17 ENCOUNTER — NON-APPOINTMENT (OUTPATIENT)
Age: 75
End: 2025-01-17

## 2025-01-17 ENCOUNTER — APPOINTMENT (OUTPATIENT)
Dept: CARDIOLOGY | Facility: CLINIC | Age: 75
End: 2025-01-17
Payer: MEDICARE

## 2025-01-17 VITALS
DIASTOLIC BLOOD PRESSURE: 70 MMHG | TEMPERATURE: 97.7 F | HEIGHT: 62 IN | WEIGHT: 118 LBS | OXYGEN SATURATION: 100 % | BODY MASS INDEX: 21.71 KG/M2 | HEART RATE: 66 BPM | SYSTOLIC BLOOD PRESSURE: 118 MMHG

## 2025-01-17 DIAGNOSIS — E78.5 HYPERLIPIDEMIA, UNSPECIFIED: ICD-10-CM

## 2025-01-17 DIAGNOSIS — I10 ESSENTIAL (PRIMARY) HYPERTENSION: ICD-10-CM

## 2025-01-17 DIAGNOSIS — I25.10 ATHEROSCLEROTIC HEART DISEASE OF NATIVE CORONARY ARTERY W/OUT ANGINA PECTORIS: ICD-10-CM

## 2025-01-17 PROCEDURE — 99214 OFFICE O/P EST MOD 30 MIN: CPT

## 2025-01-17 PROCEDURE — 93000 ELECTROCARDIOGRAM COMPLETE: CPT

## 2025-02-03 ENCOUNTER — APPOINTMENT (OUTPATIENT)
Dept: CARDIOLOGY | Facility: CLINIC | Age: 75
End: 2025-02-03
Payer: MEDICARE

## 2025-02-03 ENCOUNTER — NON-APPOINTMENT (OUTPATIENT)
Age: 75
End: 2025-02-03

## 2025-02-03 VITALS
HEART RATE: 55 BPM | HEIGHT: 62 IN | DIASTOLIC BLOOD PRESSURE: 69 MMHG | OXYGEN SATURATION: 100 % | WEIGHT: 115 LBS | BODY MASS INDEX: 21.16 KG/M2 | SYSTOLIC BLOOD PRESSURE: 109 MMHG

## 2025-02-03 DIAGNOSIS — I25.10 ATHEROSCLEROTIC HEART DISEASE OF NATIVE CORONARY ARTERY W/OUT ANGINA PECTORIS: ICD-10-CM

## 2025-02-03 DIAGNOSIS — R07.89 OTHER CHEST PAIN: ICD-10-CM

## 2025-02-03 PROCEDURE — 99214 OFFICE O/P EST MOD 30 MIN: CPT

## 2025-02-03 PROCEDURE — 93000 ELECTROCARDIOGRAM COMPLETE: CPT

## 2025-02-03 RX ORDER — EZETIMIBE 10 MG/1
10 TABLET ORAL
Qty: 90 | Refills: 3 | Status: ACTIVE | COMMUNITY
Start: 2025-02-03 | End: 1900-01-01

## 2025-07-30 ENCOUNTER — APPOINTMENT (OUTPATIENT)
Dept: CARDIOLOGY | Facility: CLINIC | Age: 75
End: 2025-07-30
Payer: MEDICARE

## 2025-07-30 VITALS
OXYGEN SATURATION: 98 % | SYSTOLIC BLOOD PRESSURE: 152 MMHG | DIASTOLIC BLOOD PRESSURE: 77 MMHG | WEIGHT: 121 LBS | HEART RATE: 51 BPM | HEIGHT: 62 IN | BODY MASS INDEX: 22.26 KG/M2

## 2025-07-30 VITALS — SYSTOLIC BLOOD PRESSURE: 158 MMHG | DIASTOLIC BLOOD PRESSURE: 70 MMHG

## 2025-07-30 DIAGNOSIS — I10 ESSENTIAL (PRIMARY) HYPERTENSION: ICD-10-CM

## 2025-07-30 DIAGNOSIS — I25.10 ATHEROSCLEROTIC HEART DISEASE OF NATIVE CORONARY ARTERY W/OUT ANGINA PECTORIS: ICD-10-CM

## 2025-07-30 DIAGNOSIS — E78.5 HYPERLIPIDEMIA, UNSPECIFIED: ICD-10-CM

## 2025-07-30 PROCEDURE — 93000 ELECTROCARDIOGRAM COMPLETE: CPT

## 2025-07-30 PROCEDURE — 99214 OFFICE O/P EST MOD 30 MIN: CPT

## 2025-09-15 ENCOUNTER — APPOINTMENT (OUTPATIENT)
Dept: CARDIOLOGY | Facility: CLINIC | Age: 75
End: 2025-09-15
Payer: MEDICARE

## 2025-09-15 PROCEDURE — 93306 TTE W/DOPPLER COMPLETE: CPT

## (undated) DEVICE — WARMING BLANKET UPPER ADULT

## (undated) DEVICE — SUT VICRYL 2-0 18" CP-2 UNDYED (POP-OFF)

## (undated) DEVICE — BIPOLAR FORCEP HENSLER BAYONET 10" X 1MM (YELLOW)

## (undated) DEVICE — DRAPE 3/4 SHEET W REINFORCEMENT 56X77"

## (undated) DEVICE — FRAZIER SUCTION TIP 10FR

## (undated) DEVICE — DRAPE C ARM 41X140"

## (undated) DEVICE — Device

## (undated) DEVICE — GLV 7.5 PROTEXIS (WHITE)

## (undated) DEVICE — ELCTR BOVIE TIP BLADE INSULATED 2.75" EDGE WITH SAFETY

## (undated) DEVICE — DRSG EYE PAD OVAL STERILE

## (undated) DEVICE — TUBING BIPOLAR IRRIGATOR AND CORD SET

## (undated) DEVICE — SOL IRR POUR NS 0.9% 1000ML

## (undated) DEVICE — DRAPE SURGICAL #1010

## (undated) DEVICE — FRA-ESU BOVIE FORCE TRIAD T7J19745DX: Type: DURABLE MEDICAL EQUIPMENT

## (undated) DEVICE — NDL SPINAL 18G X 3.5" (PINK)

## (undated) DEVICE — DRAPE INSTRUMENT POUCH 6.75" X 11"

## (undated) DEVICE — MIDAS REX LEGEND MATCH HEAD FLUTED LG BORE 3.0MM X 14CM

## (undated) DEVICE — PACK POSTERIOR SPINAL FUSION

## (undated) DEVICE — DRSG XEROFORM 5 X 9"

## (undated) DEVICE — SUT VICRYL 1 18" CT-1 UNDYED (POP-OFF)

## (undated) DEVICE — PREP DURAPREP 26CC

## (undated) DEVICE — DRSG TEGADERM 4X4.75"

## (undated) DEVICE — DRSG 4 X 4" 4PLY STERILE

## (undated) DEVICE — SUT VICRYL 0 18" CT-1 UNDYED (POP-OFF)

## (undated) DEVICE — DRAPE SHOWER CURTAIN ISOLATION

## (undated) DEVICE — MISONIX BONESCALPEL BLUNT BLADE & TUBESET 20MM

## (undated) DEVICE — GLV 8 PROTEXIS (BLUE)

## (undated) DEVICE — DRSG DERMABOND PRINEO 22CM

## (undated) DEVICE — DRSG KERLIX ROLL 4.5"

## (undated) DEVICE — VENODYNE/SCD SLEEVE CALF MEDIUM

## (undated) DEVICE — SUT STRATAFIX SPIRAL MONOCRYL PLUS 4-0 45CM PS-2 UNDYED

## (undated) DEVICE — DRAPE TOWEL BLUE 17" X 24"